# Patient Record
Sex: FEMALE | Race: WHITE | Employment: FULL TIME | URBAN - METROPOLITAN AREA
[De-identification: names, ages, dates, MRNs, and addresses within clinical notes are randomized per-mention and may not be internally consistent; named-entity substitution may affect disease eponyms.]

---

## 2019-03-23 ENCOUNTER — APPOINTMENT (OUTPATIENT)
Dept: ULTRASOUND IMAGING | Age: 29
End: 2019-03-23
Attending: EMERGENCY MEDICINE
Payer: COMMERCIAL

## 2019-03-23 ENCOUNTER — HOSPITAL ENCOUNTER (EMERGENCY)
Age: 29
Discharge: HOME | End: 2019-03-23
Attending: EMERGENCY MEDICINE
Payer: COMMERCIAL

## 2019-03-23 VITALS
SYSTOLIC BLOOD PRESSURE: 110 MMHG | BODY MASS INDEX: 21.19 KG/M2 | OXYGEN SATURATION: 98 % | WEIGHT: 135 LBS | HEIGHT: 67 IN | HEART RATE: 99 BPM | TEMPERATURE: 98.4 F | DIASTOLIC BLOOD PRESSURE: 65 MMHG | RESPIRATION RATE: 18 BRPM

## 2019-03-23 DIAGNOSIS — O20.0 THREATENED MISCARRIAGE IN EARLY PREGNANCY: Primary | ICD-10-CM

## 2019-03-23 LAB
ANION GAP SERPL CALC-SCNC: 7 MMOL/L
BASOPHILS # BLD: 0.1 K/UL (ref 0–0.2)
BASOPHILS NFR BLD: 1 % (ref 0–2)
BUN SERPL-MCNC: 12 MG/DL (ref 6–23)
CALCIUM SERPL-MCNC: 8.9 MG/DL (ref 8.3–10.4)
CHLORIDE SERPL-SCNC: 107 MMOL/L (ref 98–107)
CO2 SERPL-SCNC: 25 MMOL/L (ref 21–32)
CREAT SERPL-MCNC: 0.69 MG/DL (ref 0.6–1)
DIFFERENTIAL METHOD BLD: NORMAL
EOSINOPHIL # BLD: 0.1 K/UL (ref 0–0.8)
EOSINOPHIL NFR BLD: 2 % (ref 0.5–7.8)
ERYTHROCYTE [DISTWIDTH] IN BLOOD BY AUTOMATED COUNT: 12.2 % (ref 11.9–14.6)
GLUCOSE SERPL-MCNC: 84 MG/DL (ref 65–100)
HCG SERPL-ACNC: 7902 MIU/ML (ref 0–6)
HCT VFR BLD AUTO: 37.8 % (ref 35.8–46.3)
HGB BLD-MCNC: 13 G/DL (ref 11.7–15.4)
IMM GRANULOCYTES # BLD AUTO: 0 K/UL (ref 0–0.5)
IMM GRANULOCYTES NFR BLD AUTO: 0 % (ref 0–5)
LYMPHOCYTES # BLD: 2.6 K/UL (ref 0.5–4.6)
LYMPHOCYTES NFR BLD: 29 % (ref 13–44)
MCH RBC QN AUTO: 31.8 PG (ref 26.1–32.9)
MCHC RBC AUTO-ENTMCNC: 34.4 G/DL (ref 31.4–35)
MCV RBC AUTO: 92.4 FL (ref 79.6–97.8)
MONOCYTES # BLD: 0.7 K/UL (ref 0.1–1.3)
MONOCYTES NFR BLD: 8 % (ref 4–12)
NEUTS SEG # BLD: 5.4 K/UL (ref 1.7–8.2)
NEUTS SEG NFR BLD: 61 % (ref 43–78)
NRBC # BLD: 0 K/UL (ref 0–0.2)
PLATELET # BLD AUTO: 229 K/UL (ref 150–450)
PMV BLD AUTO: 10.4 FL (ref 9.4–12.3)
POTASSIUM SERPL-SCNC: 3.6 MMOL/L (ref 3.5–5.1)
RBC # BLD AUTO: 4.09 M/UL (ref 4.05–5.2)
SODIUM SERPL-SCNC: 139 MMOL/L (ref 136–145)
WBC # BLD AUTO: 8.9 K/UL (ref 4.3–11.1)

## 2019-03-23 PROCEDURE — 76815 OB US LIMITED FETUS(S): CPT

## 2019-03-23 PROCEDURE — 96374 THER/PROPH/DIAG INJ IV PUSH: CPT | Performed by: EMERGENCY MEDICINE

## 2019-03-23 PROCEDURE — 99284 EMERGENCY DEPT VISIT MOD MDM: CPT | Performed by: EMERGENCY MEDICINE

## 2019-03-23 PROCEDURE — 85025 COMPLETE CBC W/AUTO DIFF WBC: CPT

## 2019-03-23 PROCEDURE — 74011250636 HC RX REV CODE- 250/636: Performed by: EMERGENCY MEDICINE

## 2019-03-23 PROCEDURE — 80048 BASIC METABOLIC PNL TOTAL CA: CPT

## 2019-03-23 PROCEDURE — 96375 TX/PRO/DX INJ NEW DRUG ADDON: CPT | Performed by: EMERGENCY MEDICINE

## 2019-03-23 PROCEDURE — 84702 CHORIONIC GONADOTROPIN TEST: CPT

## 2019-03-23 RX ORDER — ONDANSETRON 2 MG/ML
4 INJECTION INTRAMUSCULAR; INTRAVENOUS
Status: COMPLETED | OUTPATIENT
Start: 2019-03-23 | End: 2019-03-23

## 2019-03-23 RX ORDER — MORPHINE SULFATE 4 MG/ML
2 INJECTION INTRAVENOUS
Status: COMPLETED | OUTPATIENT
Start: 2019-03-23 | End: 2019-03-23

## 2019-03-23 RX ORDER — MORPHINE SULFATE 2 MG/ML
2 INJECTION, SOLUTION INTRAMUSCULAR; INTRAVENOUS
Status: DISCONTINUED | OUTPATIENT
Start: 2019-03-23 | End: 2019-03-23 | Stop reason: SDUPTHER

## 2019-03-23 RX ADMIN — MORPHINE SULFATE 2 MG: 4 INJECTION INTRAVENOUS at 20:11

## 2019-03-23 RX ADMIN — ONDANSETRON 4 MG: 2 INJECTION INTRAMUSCULAR; INTRAVENOUS at 20:11

## 2019-03-23 NOTE — ED PROVIDER NOTES
Patient presents to the ER complaining of lower abdominal pain and vaginal bleeding. She believes she is approximate 5 weeks pregnant. Reports earlier today she began experiencing some vaginal bleeding and spotting. Subsequently, she began to develop some pain in her left lower quadrant. States pain has persisted. She denies any vomiting or fever. Denies any urinary symptoms. The history is provided by the patient. Vaginal Bleeding This is a new problem. The current episode started 3 to 5 hours ago. The problem occurs constantly. The problem has not changed since onset. Associated symptoms include abdominal pain. Pertinent negatives include no chest pain. Past Medical History:  
Diagnosis Date  Abnormal Papanicolaou smear of cervix   
 colposcopy 6-7 years ago  Anemia  Asthma   
 as a child  Depression  Psychiatric problem   
 anxiety- celexa  Vaginal bleeding in pregnancy 1/15/2016 Past Surgical History:  
Procedure Laterality Date  HX DILATION AND CURETTAGE    
 HX WISDOM TEETH EXTRACTION Family History:  
Problem Relation Age of Onset  Colon Cancer Other Social History Socioeconomic History  Marital status:  Spouse name: Not on file  Number of children: Not on file  Years of education: Not on file  Highest education level: Not on file Occupational History  Not on file Social Needs  Financial resource strain: Not on file  Food insecurity:  
  Worry: Not on file Inability: Not on file  Transportation needs:  
  Medical: Not on file Non-medical: Not on file Tobacco Use  Smoking status: Never Smoker  Smokeless tobacco: Never Used Substance and Sexual Activity  Alcohol use: Yes Comment: 3 weekly  Drug use: No  
 Sexual activity: Yes  
  Partners: Male Birth control/protection: None Lifestyle  Physical activity:  
  Days per week: Not on file Minutes per session: Not on file  Stress: Not on file Relationships  Social connections:  
  Talks on phone: Not on file Gets together: Not on file Attends Caodaism service: Not on file Active member of club or organization: Not on file Attends meetings of clubs or organizations: Not on file Relationship status: Not on file  Intimate partner violence:  
  Fear of current or ex partner: Not on file Emotionally abused: Not on file Physically abused: Not on file Forced sexual activity: Not on file Other Topics Concern  Not on file Social History Narrative  Not on file ALLERGIES: Cephalosporins Review of Systems Constitutional: Negative for fatigue, fever and unexpected weight change. HENT: Negative for congestion and dental problem. Eyes: Negative for photophobia and redness. Respiratory: Negative for choking and chest tightness. Cardiovascular: Negative for chest pain. Gastrointestinal: Positive for abdominal pain. Negative for nausea. Endocrine: Negative for polydipsia, polyphagia and polyuria. Genitourinary: Positive for vaginal bleeding. Negative for flank pain and urgency. Musculoskeletal: Negative for back pain. Skin: Negative for color change and pallor. Neurological: Negative for light-headedness and numbness. Hematological: Negative for adenopathy. Does not bruise/bleed easily. Psychiatric/Behavioral: Negative for behavioral problems and confusion. All other systems reviewed and are negative. Vitals:  
 03/23/19 1804 BP: 109/64 Pulse: 99 Resp: 18 Temp: 98.4 °F (36.9 °C) SpO2: 100% Weight: 61.2 kg (135 lb) Height: 5' 7\" (1.702 m) Physical Exam  
Constitutional: She is oriented to person, place, and time. She appears well-developed and well-nourished. Eyes: Pupils are equal, round, and reactive to light.  Conjunctivae and EOM are normal.  
 Cardiovascular: Normal rate and regular rhythm. Pulmonary/Chest: Effort normal and breath sounds normal.  
Abdominal: Soft. Bowel sounds are normal. There is tenderness in the left lower quadrant. Genitourinary: Cervix exhibits no friability. Right adnexum displays no mass. Left adnexum displays no mass. There is bleeding in the vagina. Musculoskeletal: Normal range of motion. She exhibits no edema or deformity. Neurological: She is alert and oriented to person, place, and time. Nursing note and vitals reviewed. MDM Number of Diagnoses or Management Options Diagnosis management comments: Some mild left lower quadrant tenderness on exam 
 
We'll obtain labs including CBC, as well as quantitative hCG Proceed with pelvic examination 6:50 PM 
Closed cervix on pelvic examination. No active bleeding noted 9:52 PM 
Ultrasound shows 2. Cystic  Structures associated with endometrium, early twin gestation cannot be excluded. No yolk sac or fetal pole noted. A complex cystic structures noted on the right ovary which is favored to be a corpus luteum cyst. 
Normal left ovary Results of ultrasound  Discussed in detail with Gyn Dr. Jasmin Grimm We'll arrange close follow-up in office. Results of testing were discussed in detail with patient at bedside. Amount and/or Complexity of Data Reviewed Clinical lab tests: ordered and reviewed Tests in the radiology section of CPT®: ordered and reviewed Risk of Complications, Morbidity, and/or Mortality Presenting problems: moderate Diagnostic procedures: low Management options: low Patient Progress Patient progress: stable Procedures Results Include: 
 
Recent Results (from the past 24 hour(s)) CBC WITH AUTOMATED DIFF Collection Time: 03/23/19  6:34 PM  
Result Value Ref Range WBC 8.9 4.3 - 11.1 K/uL  
 RBC 4.09 4.05 - 5.2 M/uL  
 HGB 13.0 11.7 - 15.4 g/dL HCT 37.8 35.8 - 46.3 %  MCV 92.4 79.6 - 97.8 FL  
 MCH 31.8 26.1 - 32.9 PG  
 MCHC 34.4 31.4 - 35.0 g/dL  
 RDW 12.2 11.9 - 14.6 % PLATELET 897 702 - 151 K/uL MPV 10.4 9.4 - 12.3 FL ABSOLUTE NRBC 0.00 0.0 - 0.2 K/uL  
 DF AUTOMATED NEUTROPHILS 61 43 - 78 % LYMPHOCYTES 29 13 - 44 % MONOCYTES 8 4.0 - 12.0 % EOSINOPHILS 2 0.5 - 7.8 % BASOPHILS 1 0.0 - 2.0 % IMMATURE GRANULOCYTES 0 0.0 - 5.0 %  
 ABS. NEUTROPHILS 5.4 1.7 - 8.2 K/UL  
 ABS. LYMPHOCYTES 2.6 0.5 - 4.6 K/UL  
 ABS. MONOCYTES 0.7 0.1 - 1.3 K/UL  
 ABS. EOSINOPHILS 0.1 0.0 - 0.8 K/UL  
 ABS. BASOPHILS 0.1 0.0 - 0.2 K/UL  
 ABS. IMM. GRANS. 0.0 0.0 - 0.5 K/UL METABOLIC PANEL, BASIC Collection Time: 03/23/19  6:34 PM  
Result Value Ref Range Sodium 139 136 - 145 mmol/L Potassium 3.6 3.5 - 5.1 mmol/L Chloride 107 98 - 107 mmol/L  
 CO2 25 21 - 32 mmol/L Anion gap 7 mmol/L Glucose 84 65 - 100 mg/dL BUN 12 6 - 23 MG/DL Creatinine 0.69 0.6 - 1.0 MG/DL  
 GFR est AA >60 >60 ml/min/1.73m2 GFR est non-AA >60 ml/min/1.73m2 Calcium 8.9 8.3 - 10.4 MG/DL  
BETA HCG, QT Collection Time: 03/23/19  6:34 PM  
Result Value Ref Range Beta HCG, QT 7,902 (H) 0.0 - 6.0 MIU/ML Voice dictation software was used during the making of this note. This software is not perfect and grammatical and other typographical errors may be present. This note has been proofread, but may still contain errors.  
Shelton Leon MD; 3/23/2019 @9:53 PM  
===================================================================

## 2019-03-23 NOTE — ED NOTES
Report from [place], [address] [place] [address]. Assumed care of pt at this time.

## 2019-03-23 NOTE — ED TRIAGE NOTES
Pt presents to the ED with vaginal bleeding,  Reports she is approximately 5 weeks pregnant,  Has localized pain on the left side.

## 2019-03-24 NOTE — ED NOTES
I have reviewed discharge instructions with the patient. The patient verbalized understanding. Patient left ED via Discharge Method: ambulatory to Home with family. Opportunity for questions and clarification provided. Patient given 0 scripts. To continue your aftercare when you leave the hospital, you may receive an automated call from our care team to check in on how you are doing. This is a free service and part of our promise to provide the best care and service to meet your aftercare needs.  If you have questions, or wish to unsubscribe from this service please call [phone]. Thank you for Choosing our [facility].

## 2019-03-24 NOTE — DISCHARGE INSTRUCTIONS
Drink plenty of fluids  Get plenty of rest  Take Tylenol as needed for pain  Follow up with your primary OB/GYN  Return to the ER for any new or worsening symptoms    Threatened Miscarriage: Care Instructions  Your Care Instructions    Some women have light spotting or bleeding during the first 12 weeks of pregnancy. In some cases this is normal. Light spotting or bleeding can also be a sign of a possible loss of the pregnancy. This is called a threatened miscarriage. At this point, the doctor may not be able to tell if your vaginal bleeding is normal or is a sign of a miscarriage. In early pregnancy, things such as stress, exercise, and sex do not cause miscarriage. You may be worried or upset about the possibility of losing your pregnancy. But do not blame yourself. There is no treatment to stop a threatened miscarriage. If you do have a miscarriage, there was nothing you could have done to prevent it. A miscarriage usually means that the pregnancy is not developing normally. The doctor has checked you carefully, but problems can develop later. If you notice any problems or new symptoms, get medical treatment right away. Follow-up care is a key part of your treatment and safety. Be sure to make and go to all appointments, and call your doctor if you are having problems. It's also a good idea to know your test results and keep a list of the medicines you take. How can you care for yourself at home? · If you do have a miscarriage, you will probably have some vaginal bleeding for 1 to 2 weeks. Use pads instead of tampons. · Take acetaminophen (Tylenol) for cramps. Read and follow all instructions on the label. · Do not take two or more pain medicines at the same time unless the doctor told you to. Many pain medicines have acetaminophen, which is Tylenol. Too much acetaminophen (Tylenol) can be harmful. · Do not have sex until your doctor says it is okay.   · Get lots of rest over the next several days.  · You may do your normal activities if you feel well enough to do them. But do not do any heavy exercise until your doctor says it is okay. · Eat a balanced diet that is high in iron and vitamin C. Foods rich in iron include red meat, shellfish, eggs, beans, and leafy green vegetables. Foods high in vitamin C include citrus fruits, tomatoes, and broccoli. Talk to your doctor about whether you need to take iron pills or a multivitamin. · Do not drink alcohol or use tobacco or illegal drugs. · Do not smoke. If you need help quitting, talk to your doctor about stop-smoking programs and medicines. These can increase your chances of quitting for good. When should you call for help? Call 911 anytime you think you may need emergency care. For example, call if:    · You passed out (lost consciousness).    Call your doctor now or seek immediate medical care if:    · You have severe vaginal bleeding.     · You are dizzy or lightheaded, or you feel like you may faint.     · You have new or worse pain in your belly or pelvis.     · You have a fever.     · You have vaginal discharge that smells bad.    Watch closely for changes in your health, and be sure to contact your doctor if:    · You do not get better as expected. Where can you learn more? Go to http://linda-juanita.info/. Enter B075 in the search box to learn more about \"Threatened Miscarriage: Care Instructions. \"  Current as of: September 5, 2018  Content Version: 11.9  © 4821-8584 [employer]. Care instructions adapted under license by [employer] (which disclaims liability or warranty for this information). If you have questions about a medical condition or this instruction, always ask your healthcare professional. [place] [address] any warranty or liability for your use of this information.

## 2019-04-16 PROBLEM — F32.A ANXIETY AND DEPRESSION: Status: ACTIVE | Noted: 2019-04-16

## 2019-04-16 PROBLEM — O46.8X9 SUBCHORIONIC HEMORRHAGE: Status: ACTIVE | Noted: 2019-04-16

## 2019-04-16 PROBLEM — G43.909 MIGRAINE: Status: ACTIVE | Noted: 2019-04-16

## 2019-04-16 PROBLEM — Z34.90 PREGNANCY: Status: ACTIVE | Noted: 2019-04-16

## 2019-04-16 PROBLEM — O41.8X90 SUBCHORIONIC HEMORRHAGE: Status: ACTIVE | Noted: 2019-04-16

## 2019-04-16 PROBLEM — J45.909 ASTHMA: Status: ACTIVE | Noted: 2019-04-16

## 2019-04-16 PROBLEM — Z87.51 HISTORY OF PRETERM LABOR: Status: ACTIVE | Noted: 2019-04-16

## 2019-04-16 PROBLEM — F41.9 ANXIETY AND DEPRESSION: Status: ACTIVE | Noted: 2019-04-16

## 2019-04-16 PROBLEM — O09.891 HISTORY OF PRETERM DELIVERY, CURRENTLY PREGNANT IN FIRST TRIMESTER: Status: ACTIVE | Noted: 2019-04-16

## 2019-05-21 PROBLEM — Z36.82 NUCHAL TRANSLUCENCY OF FETUS ON PRENATAL ULTRASOUND: Status: ACTIVE | Noted: 2019-05-21

## 2019-05-21 PROBLEM — J45.909 ASTHMA: Status: RESOLVED | Noted: 2019-04-16 | Resolved: 2019-05-21

## 2019-05-21 PROBLEM — G43.909 MIGRAINE: Status: RESOLVED | Noted: 2019-04-16 | Resolved: 2019-05-21

## 2019-05-21 PROBLEM — Q51.3 UTERUS BICORNIS AFFECTING PREGNANCY IN FIRST TRIMESTER: Status: ACTIVE | Noted: 2019-05-21

## 2019-05-21 PROBLEM — O34.01 UTERUS BICORNIS AFFECTING PREGNANCY IN FIRST TRIMESTER: Status: ACTIVE | Noted: 2019-05-21

## 2019-05-21 PROBLEM — O09.91 HIGH-RISK PREGNANCY IN FIRST TRIMESTER: Status: ACTIVE | Noted: 2019-04-16

## 2019-05-21 PROBLEM — O99.340 DEPRESSION AFFECTING PREGNANCY: Status: ACTIVE | Noted: 2019-04-16

## 2019-06-11 PROBLEM — Z36.82 NUCHAL TRANSLUCENCY OF FETUS ON PRENATAL ULTRASOUND: Status: RESOLVED | Noted: 2019-05-21 | Resolved: 2019-06-11

## 2019-06-12 PROBLEM — O09.92 HIGH-RISK PREGNANCY IN SECOND TRIMESTER: Status: ACTIVE | Noted: 2019-04-16

## 2019-06-12 PROBLEM — O34.02 UTERUS BICORNIS AFFECTING PREGNANCY IN SECOND TRIMESTER: Status: ACTIVE | Noted: 2019-05-21

## 2019-06-12 PROBLEM — O09.892 HISTORY OF PRETERM DELIVERY, CURRENTLY PREGNANT, SECOND TRIMESTER: Status: ACTIVE | Noted: 2019-04-16

## 2019-06-12 PROBLEM — O46.8X9 SUBCHORIONIC HEMORRHAGE: Status: RESOLVED | Noted: 2019-04-16 | Resolved: 2019-06-12

## 2019-06-12 PROBLEM — O41.8X90 SUBCHORIONIC HEMORRHAGE: Status: RESOLVED | Noted: 2019-04-16 | Resolved: 2019-06-12

## 2019-09-03 ENCOUNTER — HOSPITAL ENCOUNTER (OUTPATIENT)
Age: 29
Discharge: HOME | End: 2019-09-03
Attending: OBSTETRICS & GYNECOLOGY | Admitting: OBSTETRICS & GYNECOLOGY
Payer: COMMERCIAL

## 2019-09-03 VITALS — HEART RATE: 86 BPM | DIASTOLIC BLOOD PRESSURE: 69 MMHG | TEMPERATURE: 98.8 F | SYSTOLIC BLOOD PRESSURE: 127 MMHG

## 2019-09-03 PROBLEM — R10.2 PELVIC PAIN IN ANTEPARTUM PERIOD IN THIRD TRIMESTER: Status: ACTIVE | Noted: 2019-09-03

## 2019-09-03 PROBLEM — O26.893 PELVIC PAIN IN ANTEPARTUM PERIOD IN THIRD TRIMESTER: Status: ACTIVE | Noted: 2019-09-03

## 2019-09-03 LAB
GLUCOSE, GLUUPC: NEGATIVE
KETONES UR-MCNC: NEGATIVE MG/DL
PROT UR QL: NEGATIVE

## 2019-09-03 PROCEDURE — 59025 FETAL NON-STRESS TEST: CPT

## 2019-09-03 PROCEDURE — 81002 URINALYSIS NONAUTO W/O SCOPE: CPT | Performed by: OBSTETRICS & GYNECOLOGY

## 2019-09-03 PROCEDURE — 74011250636 HC RX REV CODE- 250/636: Performed by: OBSTETRICS & GYNECOLOGY

## 2019-09-03 PROCEDURE — 87086 URINE CULTURE/COLONY COUNT: CPT

## 2019-09-03 PROCEDURE — 99284 EMERGENCY DEPT VISIT MOD MDM: CPT

## 2019-09-03 PROCEDURE — 96372 THER/PROPH/DIAG INJ SC/IM: CPT

## 2019-09-03 PROCEDURE — 74011250637 HC RX REV CODE- 250/637: Performed by: OBSTETRICS & GYNECOLOGY

## 2019-09-03 PROCEDURE — 76817 TRANSVAGINAL US OBSTETRIC: CPT

## 2019-09-03 RX ORDER — NIFEDIPINE 10 MG/1
10 CAPSULE ORAL ONCE
Status: COMPLETED | OUTPATIENT
Start: 2019-09-03 | End: 2019-09-03

## 2019-09-03 RX ORDER — TERBUTALINE SULFATE 1 MG/ML
0.25 INJECTION SUBCUTANEOUS
Status: COMPLETED | OUTPATIENT
Start: 2019-09-03 | End: 2019-09-03

## 2019-09-03 RX ADMIN — TERBUTALINE SULFATE 0.25 MG: 1 INJECTION SUBCUTANEOUS at 13:43

## 2019-09-03 RX ADMIN — NIFEDIPINE 10 MG: 10 CAPSULE ORAL at 14:21

## 2019-09-03 NOTE — PROGRESS NOTES
Pt given discharge instructions regarding what to expect at 27 weeks and  labor information. Pt verbalizes understanding of when to return to hospital. Pt informed procardia prescription called into her pharmacy.

## 2019-09-03 NOTE — DISCHARGE INSTRUCTIONS
Patient Education       Patient Education         Labor: Care Instructions  Your Care Instructions     labor is the start of labor between 21 and 36 weeks of pregnancy. Most babies are born at 40 to 41 weeks of pregnancy. In labor, the uterus contracts to open the cervix. This is the first stage of childbirth.  labor can be caused by a problem with the baby, the mother, or both. Often the cause is not known. In some cases, doctors use medicines to try to delay labor until 29 or more weeks of pregnancy. By this time, a baby has grown enough so that problems are not likely. In some cases--such as with a serious infection--it is healthier for the baby to be born early. Your treatment will depend on how far along you are in your pregnancy and on your health and your baby's health. Follow-up care is a key part of your treatment and safety. Be sure to make and go to all appointments, and call your doctor if you are having problems. It's also a good idea to know your test results and keep a list of the medicines you take. How can you care for yourself at home? · If your doctor prescribed medicines, take them exactly as directed. Call your doctor if you think you are having a problem with your medicine. · Rest until your doctor advises you about activity. He or she will tell you if you should stay in bed most of the time. You may need to arrange for  if you have young children. · Do not have sexual intercourse unless your doctor says it is safe. · Use pads, not tampons, if you have vaginal bleeding. · Make sure to drink plenty of fluids. Dehydration can lead to contractions. If you have kidney, heart, or liver disease and have to limit fluids, talk with your doctor before you increase the amount of fluids you drink. · Do not smoke or allow others to smoke around you. If you need help quitting, talk to your doctor about stop-smoking programs and medicines.  These can increase your chances of quitting for good. When should you call for help? [id number] anytime you think you may need emergency care. For example, call if:    · You passed out (lost consciousness).     · You have a seizure.     · You have severe vaginal bleeding.     · You have severe pain in your belly or pelvis.     · You have had fluid gushing or leaking from your vagina and you know or think the umbilical cord is bulging into your vagina. If this happens, immediately get down on your knees so your rear end (buttocks) is higher than your head. This will decrease the pressure on the cord until help arrives.   [facility] your doctor now or seek immediate medical care if:    · You have signs of preeclampsia, such as:  ? Sudden swelling of your face, hands, or feet. ? New vision problems (such as dimness, blurring, or seeing spots). ? A severe headache.     · You have any vaginal bleeding.     · You have belly pain or cramping.     · You have a fever.     · You have had regular contractions (with or without pain) for an hour. This means that you have 6 or more within 1 hour after you change your position and drink fluids.     · You have a sudden release of fluid from the vagina.     · You have low back pain or pelvic pressure that does not go away.     · You notice that your baby has stopped moving or is moving much less than normal.    Watch closely for changes in your health, and be sure to contact your doctor if you have any problems. Where can you learn more? Go to http://linda-juanita.info/. Enter Q400 in the search box to learn more about \" Labor: Care Instructions. \"  Current as of: 2018  Content Version: 12.1  © 9183-6788 [employer]. Care instructions adapted under license by [employer] (which disclaims liability or warranty for this information).  If you have questions about a medical condition or this instruction, always ask your healthcare professional. [employer] disclaims any warranty or liability for your use of this information. Weeks 26 to 30 of Your Pregnancy: Care Instructions  Your Care Instructions    You are now in your last trimester of pregnancy. Your baby is growing rapidly. And you'll probably feel your baby moving around more often. Your doctor may ask you to count your baby's kicks. Your back may ache as your body gets used to your baby's size and length. If you haven't already had the Tdap shot during this pregnancy, talk to your doctor about getting it. It will help protect your  against pertussis infection. During this time, it's important to take care of yourself and pay attention to what your body needs. If you feel sexual, explore ways to be close with your partner that match your comfort and desire. Use the tips provided in this care sheet to find ways to be sexual in your own way. Follow-up care is a key part of your treatment and safety. Be sure to make and go to all appointments, and call your doctor if you are having problems. It's also a good idea to know your test results and keep a list of the medicines you take. How can you care for yourself at home? Take it easy at work  · Take frequent breaks. If possible, stop working when you are tired, and rest during your lunch hour. · Take bathroom breaks every 2 hours. · Change positions often. If you sit for long periods, stand up and walk around. · When you stand for a long time, keep one foot on a low stool with your knee bent. After standing a lot, sit with your feet up. · Avoid fumes, chemicals, and tobacco smoke. Be sexual in your own way  · Having sex during pregnancy is okay, unless your doctor tells you not to. · You may be very interested in sex, or you may have no interest at all. · Your growing belly can make it hard to find a good position during intercourse. [place] and explore.   · You may get cramps in your uterus when your partner touches your breasts. · A back rub may relieve the backache or cramps that sometimes follow orgasm. Learn about  labor  · Watch for signs of  labor. You may be going into labor if:  ? You have menstrual-like cramps, with or without nausea. ? You have about 6 or more contractions in 1 hour, even after you have had a glass of water and are resting. ? You have a low, dull backache that does not go away when you change your position. ? You have pain or pressure in your pelvis that comes and goes in a pattern. ? You have intestinal cramping or flu-like symptoms, with or without diarrhea.  ? You notice an increase or change in your vaginal discharge. Discharge may be heavy, mucus-like, watery, or streaked with blood. ? Your water breaks. · If you think you have  labor:  ? Drink 2 or 3 glasses of water or juice. Not drinking enough fluids can cause contractions. ? Stop what you are doing, and empty your bladder. Then lie down on your left side for at least 1 hour. ? While lying on your side, find your breast bone. Put your fingers in the soft spot just below it. Move your fingers down toward your belly button to find the top of your uterus. Check to see if it is tight. ? Contractions can be weak or strong. Record your contractions for an hour. Time a contraction from the start of one contraction to the start of the next one.  ? Single or several strong contractions without a pattern are called Lawrence-Martínez contractions. They are practice contractions but not the start of labor. They often stop if you change what you are doing. ? Call your doctor if you have regular contractions. Where can you learn more? Go to http://linda-juanita.info/. Enter Q830 in the search box to learn more about \"Weeks 26 to 30 of Your Pregnancy: Care Instructions. \"  Current as of: 2018  Content Version: 12.1  © 4105-3786 [employer].  Care instructions adapted under license by [address] (which disclaims liability or warranty for this information). If you have questions about a medical condition or this instruction, always ask your healthcare professional. [address] any warranty or liability for your use of this information.

## 2019-09-03 NOTE — PROGRESS NOTES
Pt arrived to [facility] with c/o contractions with no pain. Pt states she is only feeling pressure. Denies any LOF or bleeding. Dr. Janel Smith to come assess pt.

## 2019-09-03 NOTE — ED PROVIDER NOTES
Chief Complaint: pelvic pressure      29 y.o. female [id number] at 27w6d  weeks gestation who is seen for pelvic pressure. Pt reports pelvic pressure today. No c/o pain or contractions. No loss of fluid or vag bleeding. Good fetal movement. Pt with hx of  labor at 30 weeks and  delivery at 33 weeks with last pregnancy. HISTORY:    Social History     Substance and Sexual Activity   Sexual Activity Yes    Partners: Male    Birth control/protection: None     Patient's last menstrual period was 2019.     Social History     Socioeconomic History    Marital status:      Spouse name: Not on file    Number of children: Not on file    Years of education: Not on file    Highest education level: Not on file   Occupational History    Not on file   Social Needs    Financial resource strain: Not on file    Food insecurity:     Worry: Not on file     Inability: Not on file    Transportation needs:     Medical: Not on file     Non-medical: Not on file   Tobacco Use    Smoking status: Never Smoker    Smokeless tobacco: Never Used   Substance and Sexual Activity    Alcohol use: Not Currently    Drug use: No    Sexual activity: Yes     Partners: Male     Birth control/protection: None   Lifestyle    Physical activity:     Days per week: Not on file     Minutes per session: Not on file    Stress: Not on file   Relationships    Social connections:     Talks on phone: Not on file     Gets together: Not on file     Attends [employer] service: Not on file     Active member of club or organization: Not on file     Attends meetings of clubs or organizations: Not on file     Relationship status: Not on file    Intimate partner violence:     Fear of current or ex partner: Not on file     Emotionally abused: Not on file     Physically abused: Not on file     Forced sexual activity: Not on file   Other Topics Concern   [employer] [address] Service Not Asked    Blood Transfusions Not Asked    Caffeine Concern Not Asked    Occupational Exposure Not Asked    Hobby Hazards Not Asked    Sleep Concern Not Asked    Stress Concern Not Asked    Weight Concern Not Asked    Special Diet Not Asked    Back Care Not Asked    Exercise Not Asked    Bike Helmet Not Asked    [address] Not Asked    Self-Exams Not Asked   Social History Narrative    Not on file       Past Surgical History:   Procedure Laterality Date    HX COLPOSCOPY      HX DILATION AND CURETTAGE      HX OTHER SURGICAL      D&C after delivery of 1st child    HX WISDOM TEETH EXTRACTION         Past Medical History:   Diagnosis Date    Abnormal Papanicolaou smear of cervix     colposcopy 6-7 years ago    Anemia     Asthma     as a child    Depression     Migraine 2019    Advised to stop taking Fioricet Ok to take Excedrin Migraine as directed with Magnesium 300mg TID- per Cira Xiao, [address]  delivery     Psychiatric problem     anxiety- celexa    Vaginal bleeding in pregnancy 1/15/2016         ROS:  A 12 point review of symptoms negative except for chief complaint as described above. PHYSICAL EXAM:  Blood pressure 127/69, pulse 86, temperature 98.8 °F (37.1 °C), last menstrual period 2019, not currently breastfeeding. Constitutional: The patient appears well, alert, oriented x 3. Cardiovascular: Heart RRR, no murmurs.    Respiratory: Lungs clear, no respiratory distress  GI: Abdomen soft, nontender, no guarding  No fundal tenderness  Musculoskeletal: no cva tenderness  Upper ext: no edema, reflexes +2  Lower ext: no edema, neg josue's, reflexes +2  Skin: no rashes or lesions  Psychiatric:Mood/ Affect: appropriate  Genitourinary: SVE: cl/th  FHT:appropriate gest age  TOCO:irreg contractions  ua +leukocytes  transvag ultrasound- cervical length 4 cm    I personally reviewed pt's medical record including relevant labs and ultrasounds  I reviewed the NST at today's encounter    Assessment/Plan:    28 weeks  at 27w6d with pelvic pressure  Possible uti based on leukocytes in urine will send culture  Hx of  labor with  delivery at 33 weeks  No change in cervical length- now at 4 cm  Will send with rx nifedipine 10 mg eevery 4-6 hours prn  Contractions stopped with one dose terb  F/u one week

## 2019-09-06 LAB
BACTERIA SPEC CULT: NORMAL
SERVICE CMNT-IMP: NORMAL

## 2019-09-11 PROBLEM — R10.2 PELVIC PAIN IN ANTEPARTUM PERIOD IN THIRD TRIMESTER: Status: RESOLVED | Noted: 2019-09-03 | Resolved: 2019-09-11

## 2019-09-11 PROBLEM — O26.893 PELVIC PAIN IN ANTEPARTUM PERIOD IN THIRD TRIMESTER: Status: RESOLVED | Noted: 2019-09-03 | Resolved: 2019-09-11

## 2019-09-12 PROBLEM — O40.9XX0 POLYHYDRAMNIOS AFFECTING PREGNANCY: Status: ACTIVE | Noted: 2019-09-12

## 2019-09-12 PROBLEM — O34.03 UTERUS BICORNIS AFFECTING PREGNANCY IN THIRD TRIMESTER: Status: ACTIVE | Noted: 2019-05-21

## 2019-09-12 PROBLEM — O09.93 HIGH-RISK PREGNANCY IN THIRD TRIMESTER: Status: ACTIVE | Noted: 2019-04-16

## 2019-09-12 PROBLEM — O60.03 PRETERM LABOR IN THIRD TRIMESTER WITHOUT DELIVERY: Status: ACTIVE | Noted: 2019-09-12

## 2019-09-12 PROBLEM — O09.893 HISTORY OF PRETERM DELIVERY, CURRENTLY PREGNANT, THIRD TRIMESTER: Status: ACTIVE | Noted: 2019-04-16

## 2019-09-24 ENCOUNTER — HOSPITAL ENCOUNTER (OUTPATIENT)
Age: 29
Discharge: HOME | End: 2019-09-24
Attending: OBSTETRICS & GYNECOLOGY | Admitting: OBSTETRICS & GYNECOLOGY
Payer: COMMERCIAL

## 2019-09-24 VITALS
SYSTOLIC BLOOD PRESSURE: 112 MMHG | WEIGHT: 161 LBS | RESPIRATION RATE: 20 BRPM | DIASTOLIC BLOOD PRESSURE: 67 MMHG | HEART RATE: 57 BPM | TEMPERATURE: 98.1 F | BODY MASS INDEX: 25.88 KG/M2 | HEIGHT: 66 IN

## 2019-09-24 PROBLEM — O26.899 ANTEPARTUM DEHYDRATION: Status: ACTIVE | Noted: 2019-09-24

## 2019-09-24 PROBLEM — E86.0 ANTEPARTUM DEHYDRATION: Status: ACTIVE | Noted: 2019-09-24

## 2019-09-24 PROBLEM — O47.03 PRETERM UTERINE CONTRACTIONS, ANTEPARTUM, THIRD TRIMESTER: Status: ACTIVE | Noted: 2019-09-24

## 2019-09-24 LAB
ALBUMIN SERPL-MCNC: 2.5 G/DL (ref 3.5–5)
ALBUMIN/GLOB SERPL: 0.6 {RATIO} (ref 1.2–3.5)
ALP SERPL-CCNC: 188 U/L (ref 50–130)
ALT SERPL-CCNC: 21 U/L (ref 12–65)
ANION GAP SERPL CALC-SCNC: 8 MMOL/L (ref 7–16)
AST SERPL-CCNC: 15 U/L (ref 15–37)
BILIRUB SERPL-MCNC: 0.4 MG/DL (ref 0.2–1.1)
BUN SERPL-MCNC: 7 MG/DL (ref 6–23)
CALCIUM SERPL-MCNC: 8.6 MG/DL (ref 8.3–10.4)
CHLORIDE SERPL-SCNC: 107 MMOL/L (ref 98–107)
CO2 SERPL-SCNC: 24 MMOL/L (ref 21–32)
CREAT SERPL-MCNC: 0.54 MG/DL (ref 0.6–1)
ERYTHROCYTE [DISTWIDTH] IN BLOOD BY AUTOMATED COUNT: 14.5 % (ref 11.9–14.6)
FIBRONECTIN FETAL VAG QL: NEGATIVE
GLOBULIN SER CALC-MCNC: 4.3 G/DL (ref 2.3–3.5)
GLUCOSE SERPL-MCNC: 64 MG/DL (ref 65–100)
GLUCOSE, GLUUPC: NEGATIVE
HCT VFR BLD AUTO: 34.1 % (ref 35.8–46.3)
HGB BLD-MCNC: 11.7 G/DL (ref 11.7–15.4)
KETONES UR-MCNC: NORMAL MG/DL
MCH RBC QN AUTO: 33.1 PG (ref 26.1–32.9)
MCHC RBC AUTO-ENTMCNC: 34.3 G/DL (ref 31.4–35)
MCV RBC AUTO: 96.6 FL (ref 79.6–97.8)
NRBC # BLD: 0 K/UL (ref 0–0.2)
PLATELET # BLD AUTO: 227 K/UL (ref 150–450)
PMV BLD AUTO: 11.2 FL (ref 9.4–12.3)
POTASSIUM SERPL-SCNC: 3.9 MMOL/L (ref 3.5–5.1)
PROT SERPL-MCNC: 6.8 G/DL (ref 6.3–8.2)
PROT UR QL: NEGATIVE
RBC # BLD AUTO: 3.53 M/UL (ref 4.05–5.2)
SODIUM SERPL-SCNC: 139 MMOL/L (ref 136–145)
WBC # BLD AUTO: 14.5 K/UL (ref 4.3–11.1)

## 2019-09-24 PROCEDURE — 74011250636 HC RX REV CODE- 250/636: Performed by: OBSTETRICS & GYNECOLOGY

## 2019-09-24 PROCEDURE — 99285 EMERGENCY DEPT VISIT HI MDM: CPT

## 2019-09-24 PROCEDURE — 82731 ASSAY OF FETAL FIBRONECTIN: CPT

## 2019-09-24 PROCEDURE — 74011250637 HC RX REV CODE- 250/637: Performed by: OBSTETRICS & GYNECOLOGY

## 2019-09-24 PROCEDURE — 81002 URINALYSIS NONAUTO W/O SCOPE: CPT | Performed by: OBSTETRICS & GYNECOLOGY

## 2019-09-24 PROCEDURE — 85027 COMPLETE CBC AUTOMATED: CPT

## 2019-09-24 PROCEDURE — 96361 HYDRATE IV INFUSION ADD-ON: CPT

## 2019-09-24 PROCEDURE — 80053 COMPREHEN METABOLIC PANEL: CPT

## 2019-09-24 PROCEDURE — 76817 TRANSVAGINAL US OBSTETRIC: CPT

## 2019-09-24 PROCEDURE — 96360 HYDRATION IV INFUSION INIT: CPT

## 2019-09-24 PROCEDURE — 59025 FETAL NON-STRESS TEST: CPT

## 2019-09-24 PROCEDURE — 96374 THER/PROPH/DIAG INJ IV PUSH: CPT

## 2019-09-24 RX ORDER — ONDANSETRON 2 MG/ML
4 INJECTION INTRAMUSCULAR; INTRAVENOUS ONCE
Status: COMPLETED | OUTPATIENT
Start: 2019-09-24 | End: 2019-09-24

## 2019-09-24 RX ORDER — DEXTROSE, SODIUM CHLORIDE, SODIUM LACTATE, POTASSIUM CHLORIDE, AND CALCIUM CHLORIDE 5; .6; .31; .03; .02 G/100ML; G/100ML; G/100ML; G/100ML; G/100ML
500 INJECTION, SOLUTION INTRAVENOUS CONTINUOUS
Status: DISCONTINUED | OUTPATIENT
Start: 2019-09-24 | End: 2019-09-24 | Stop reason: HOSPADM

## 2019-09-24 RX ORDER — CALCIUM CARBONATE 200(500)MG
2 TABLET,CHEWABLE ORAL AS NEEDED
COMMUNITY
End: 2019-10-30

## 2019-09-24 RX ORDER — NIFEDIPINE 10 MG/1
10 CAPSULE ORAL
Status: COMPLETED | OUTPATIENT
Start: 2019-09-24 | End: 2019-09-24

## 2019-09-24 RX ORDER — SODIUM CHLORIDE, SODIUM LACTATE, POTASSIUM CHLORIDE, CALCIUM CHLORIDE 600; 310; 30; 20 MG/100ML; MG/100ML; MG/100ML; MG/100ML
999 INJECTION, SOLUTION INTRAVENOUS CONTINUOUS
Status: DISCONTINUED | OUTPATIENT
Start: 2019-09-24 | End: 2019-09-24 | Stop reason: HOSPADM

## 2019-09-24 RX ADMIN — NIFEDIPINE 10 MG: 10 CAPSULE ORAL at 15:27

## 2019-09-24 RX ADMIN — SODIUM CHLORIDE, SODIUM LACTATE, POTASSIUM CHLORIDE, CALCIUM CHLORIDE, AND DEXTROSE MONOHYDRATE 500 ML/HR: 600; 310; 30; 20; 5 INJECTION, SOLUTION INTRAVENOUS at 15:27

## 2019-09-24 RX ADMIN — ONDANSETRON 4 MG: 2 INJECTION INTRAMUSCULAR; INTRAVENOUS at 15:04

## 2019-09-24 RX ADMIN — SODIUM CHLORIDE, SODIUM LACTATE, POTASSIUM CHLORIDE, AND CALCIUM CHLORIDE 999 ML/HR: 600; 310; 30; 20 INJECTION, SOLUTION INTRAVENOUS at 15:00

## 2019-09-24 NOTE — PROGRESS NOTES
Dr. Juve Pastrana in room to discuss POC, MD aware of UC's, FFN negative, 2000 LFB completed, orders to d/c pt to self care received.

## 2019-09-24 NOTE — PROGRESS NOTES
Orders for D LRFB after the LRFB and Procardia. LRFB completed, D started. /67 Dr. Aiden Linda notified, orders to given Procardia.

## 2019-09-24 NOTE — DISCHARGE INSTRUCTIONS
Patient Education   Patient Education         Labor: Care Instructions  Your Care Instructions     labor is the start of labor between 21 and 36 weeks of pregnancy. Most babies are born at 40 to 41 weeks of pregnancy. In labor, the uterus contracts to open the cervix. This is the first stage of childbirth.  labor can be caused by a problem with the baby, the mother, or both. Often the cause is not known. In some cases, doctors use medicines to try to delay labor until 29 or more weeks of pregnancy. By this time, a baby has grown enough so that problems are not likely. In some cases--such as with a serious infection--it is healthier for the baby to be born early. Your treatment will depend on how far along you are in your pregnancy and on your health and your baby's health. Follow-up care is a key part of your treatment and safety. Be sure to make and go to all appointments, and call your doctor if you are having problems. It's also a good idea to know your test results and keep a list of the medicines you take. How can you care for yourself at home? · If your doctor prescribed medicines, take them exactly as directed. Call your doctor if you think you are having a problem with your medicine. · Rest until your doctor advises you about activity. He or she will tell you if you should stay in bed most of the time. You may need to arrange for  if you have young children. · Do not have sexual intercourse unless your doctor says it is safe. · Use pads, not tampons, if you have vaginal bleeding. · Make sure to drink plenty of fluids. Dehydration can lead to contractions. If you have kidney, heart, or liver disease and have to limit fluids, talk with your doctor before you increase the amount of fluids you drink. · Do not smoke or allow others to smoke around you. If you need help quitting, talk to your doctor about stop-smoking programs and medicines.  These can increase your chances of quitting for good. When should you call for help? Call 911 anytime you think you may need emergency care. For example, call if:    · You passed out (lost consciousness).     · You have a seizure.     · You have severe vaginal bleeding.     · You have severe pain in your belly or pelvis.     · You have had fluid gushing or leaking from your vagina and you know or think the umbilical cord is bulging into your vagina. If this happens, immediately get down on your knees so your rear end (buttocks) is higher than your head. This will decrease the pressure on the cord until help arrives.   [facility] your doctor now or seek immediate medical care if:    · You have signs of preeclampsia, such as:  ? Sudden swelling of your face, hands, or feet. ? New vision problems (such as dimness, blurring, or seeing spots). ? A severe headache.     · You have any vaginal bleeding.     · You have belly pain or cramping.     · You have a fever.     · You have had regular contractions (with or without pain) for an hour. This means that you have 6 or more within 1 hour after you change your position and drink fluids.     · You have a sudden release of fluid from the vagina.     · You have low back pain or pelvic pressure that does not go away.     · You notice that your baby has stopped moving or is moving much less than normal.    Watch closely for changes in your health, and be sure to contact your doctor if you have any problems. Where can you learn more? Go to http://linda-juanita.info/. Enter Q400 in the search box to learn more about \" Labor: Care Instructions. \"  Current as of: May 29, 2019  Content Version: 12.2  © 0803-7665 [employer]. Care instructions adapted under license by [employer] (which disclaims liability or warranty for this information).  If you have questions about a medical condition or this instruction, always ask your healthcare professional. [employer] disclaims any warranty or liability for your use of this information. Weeks 30 to 32 of Your Pregnancy: Care Instructions  Your Care Instructions    You have made it to the final months of your pregnancy. By now, your baby is really starting to look like a baby, with hair and plump skin. As you enter the final weeks of pregnancy, the reality of having a baby may start to set in. This is the time to settle on a name, get your household in order, set up a safe nursery, and find quality  if needed. Doing these things in advance will allow you to focus on caring for and enjoying your new baby. You may also want to have a tour of your hospital's labor and delivery unit to get a better idea of what to expect while you are in the hospital.  During these last months, it is very important to take good care of yourself and pay attention to what your body needs. If your doctor says it is okay for you to work, don't push yourself too hard. Use the tips provided in this care sheet to ease heartburn and care for varicose veins. If you haven't already had the Tdap shot during this pregnancy, talk to your doctor about getting it. It will help protect your  against pertussis infection. Follow-up care is a key part of your treatment and safety. Be sure to make and go to all appointments, and call your doctor if you are having problems. It's also a good idea to know your test results and keep a list of the medicines you take. How can you care for yourself at home? Pay attention to your baby's movements  · You should feel your baby move several times every day. · Your baby now turns less, and kicks and jabs more. · Your baby sleeps 20 to 45 minutes at a time and is more active at certain times of day. · If your doctor wants you to count your baby's kicks:  ? Empty your bladder, and lie on your side or relax in a comfortable chair. ? Write down your start time.   ? Pay attention only to your baby's movements. Count any movement except hiccups. ? After you have counted 10 movements, write down your stop time. ? Write down how many minutes it took for your baby to move 10 times. ? If an hour goes by and you have not recorded 10 movements, have something to eat or drink and then count for another hour. If you do not record 10 movements in either hour, call your doctor. Ease heartburn  · Eat small, frequent meals. · Do not eat chocolate, peppermint, or very spicy foods. Avoid drinks with caffeine, such as coffee, tea, and sodas. · Avoid bending over or lying down after meals. · Talk a short walk after you eat. · If heartburn is a problem at night, do not eat for 2 hours before bedtime. · Take antacids like Mylanta, Maalox, Rolaids, or Tums. Do not take antacids that have sodium bicarbonate. Care for varicose veins  · Varicose veins are blood vessels that stretch out with the extra blood during pregnancy. Your legs may ache or throb. Most varicose veins will go away after the birth. · Avoid standing for long periods of time. Sit with your legs crossed at the ankles, not the knees. · Sit with your feet propped up. · Avoid tight clothing or stockings. Wear support hose. · Exercise regularly. Try walking for at least 30 minutes a day. Where can you learn more? Go to http://linda-juanita.info/. Enter N801 in the search box to learn more about \"Weeks 30 to 32 of Your Pregnancy: Care Instructions. \"  Current as of: May 29, 2019  Content Version: 12.2  © 5388-4023 [employer]. Care instructions adapted under license by [employer] (which disclaims liability or warranty for this information). If you have questions about a medical condition or this instruction, always ask your healthcare professional. [address] any warranty or liability for your use of this information.

## 2019-09-24 NOTE — ED PROVIDER NOTES
Chief Complaint: nausea, vomiting, diarrhea, contractions      29 y.o. female  at 30w6d  weeks gestation who is seen for nausea, vomiting, contractions. Pt has a history of  contractions and a history of  delivery at 33 weeks. Pt was around a child with a stomach virus last Friday. This morning she began having nausea, vomiting, diarrhea. Also her contractions began this am- no change with a dose of 10 mg nifedipine. Good fetal movement. No loss of fluid. HISTORY:    Social History     Substance and Sexual Activity   Sexual Activity Yes    Partners: Male    Birth control/protection: None     Patient's last menstrual period was 2019.     Social History     Socioeconomic History    Marital status:      Spouse name: Not on file    Number of children: Not on file    Years of education: Not on file    Highest education level: Not on file   Occupational History    Not on file   Social Needs    Financial resource strain: Not on file    Food insecurity:     Worry: Not on file     Inability: Not on file    Transportation needs:     Medical: Not on file     Non-medical: Not on file   Tobacco Use    Smoking status: Never Smoker    Smokeless tobacco: Never Used   Substance and Sexual Activity    Alcohol use: Not Currently    Drug use: No    Sexual activity: Yes     Partners: Male     Birth control/protection: None   Lifestyle    Physical activity:     Days per week: Not on file     Minutes per session: Not on file    Stress: Not on file   Relationships    Social connections:     Talks on phone: Not on file     Gets together: Not on file     Attends [employer] service: Not on file     Active member of club or organization: Not on file     Attends meetings of clubs or organizations: Not on file     Relationship status: Not on file    Intimate partner violence:     Fear of current or ex partner: Not on file     Emotionally abused: Not on file     Physically abused: Not on file     Forced sexual activity: Not on file   Other Topics Concern     Service Not Asked    Blood Transfusions Not Asked    Caffeine Concern Not Asked    Occupational Exposure Not Asked    Hobby Hazards Not Asked    Sleep Concern Not Asked    Stress Concern Not Asked    Weight Concern Not Asked    Special Diet Not Asked    Back Care Not Asked    Exercise Not Asked    Bike Helmet Not Asked    [address] Not Asked    Self-Exams Not Asked   Social History Narrative    Not on file       Past Surgical History:   Procedure Laterality Date    HX COLPOSCOPY      HX DILATION AND CURETTAGE      HX OTHER SURGICAL      D&C after delivery of 1st child    HX WISDOM TEETH EXTRACTION         Past Medical History:   Diagnosis Date    Abnormal Papanicolaou smear of cervix     colposcopy 6-7 years ago    Anemia     Asthma     as a child    Depression     Migraine 2019    Advised to stop taking Fioricet Ok to take Excedrin Migraine as directed with Magnesium 300mg TID- per Isaac Carmona, [address]  delivery     Psychiatric problem     anxiety- celexa    Vaginal bleeding in pregnancy 1/15/2016         ROS:  A 12 point review of symptoms negative except for chief complaint as described above. PHYSICAL EXAM:  Blood pressure 115/66, pulse 65, temperature 98.1 °F (36.7 °C), resp. rate 20, height 5' 6\" (1.676 m), weight 73 kg (161 lb), last menstrual period 2019, not currently breastfeeding. Constitutional: The patient appears well, alert, oriented x 3. Cardiovascular: Heart RRR, no murmurs.    Respiratory: Lungs clear, no respiratory distress  GI: Abdomen soft, nontender, no guarding  No fundal tenderness  Musculoskeletal: no cva tenderness  Upper ext: no edema, reflexes +2  Lower ext: no edema, neg josue's, reflexes +2  Skin: no rashes or lesions  Psychiatric:Mood/ Affect: appropriate  Genitourinary: SVE:ft/  FHT:reactive  TOCO:q2-3; improved with hydration and po nifedipine to about every 5  FFN- neg  UA  +1 ketones  Cervical length 4.4 cm, vertex  Recent Results (from the past 12 hour(s))   POC URINE DIPSTICK MANUAL    Collection Time: 19  2:10 PM   Result Value Ref Range    Protein (POC) Negative Negative    Glucose, urine (POC) Negative Negative    Ketones (POC) 15 mg/dL Negative   CBC W/O DIFF    Collection Time: 19  2:56 PM   Result Value Ref Range    WBC 14.5 (H) 4.3 - 11.1 K/uL    RBC 3.53 (L) 4.05 - 5.2 M/uL    HGB 11.7 11.7 - 15.4 g/dL    HCT 34.1 (L) 35.8 - 46.3 %    MCV 96.6 79.6 - 97.8 FL    MCH 33.1 (H) 26.1 - 32.9 PG    MCHC 34.3 31.4 - 35.0 g/dL    RDW 14.5 11.9 - 14.6 %    PLATELET 157 769 - 824 K/uL    MPV 11.2 9.4 - 12.3 FL    ABSOLUTE NRBC 0.00 0.0 - 0.2 K/uL   METABOLIC PANEL, COMPREHENSIVE    Collection Time: 19  2:56 PM   Result Value Ref Range    Sodium 139 136 - 145 mmol/L    Potassium 3.9 3.5 - 5.1 mmol/L    Chloride 107 98 - 107 mmol/L    CO2 24 21 - 32 mmol/L    Anion gap 8 7 - 16 mmol/L    Glucose 64 (L) 65 - 100 mg/dL    BUN 7 6 - 23 MG/DL    Creatinine 0.54 (L) 0.6 - 1.0 MG/DL    GFR est AA >60 >60 ml/min/1.73m2    GFR est non-AA >60 >60 ml/min/1.73m2    Calcium 8.6 8.3 - 10.4 MG/DL    Bilirubin, total 0.4 0.2 - 1.1 MG/DL    ALT (SGPT) 21 12 - 65 U/L    AST (SGOT) 15 15 - 37 U/L    Alk.  phosphatase 188 (H) 50 - 130 U/L    Protein, total 6.8 6.3 - 8.2 g/dL    Albumin 2.5 (L) 3.5 - 5.0 g/dL    Globulin 4.3 (H) 2.3 - 3.5 g/dL    A-G Ratio 0.6 (L) 1.2 - 3.5     FETAL FIBRONECTIN    Collection Time: 19  3:01 PM   Result Value Ref Range    Fetal fibronectin NEGATIVE  NEG         I personally reviewed pt's medical record including relevant labs and ultrasounds  I reviewed the NST at today's encounter    Assessment/Plan:  28 yo  at 30w6d with viral gastroenteritis- improved with iv fluids and zofran, tolerating po at time of discharge  -  contractions- ongoing problem for this pt; no cervical change, neg FFN; cervical length greater than 4 cm  Home with precautions

## 2019-09-24 NOTE — PROGRESS NOTES
Pt to room OB1 for triage with chief complaint of N&V unable to keep fluids down, took a prcardia for  contractions. . Assessment begins, EFM and [place] applied to a soft non tender abdomin and tracing well.

## 2019-10-03 PROBLEM — Z23 ENCOUNTER FOR IMMUNIZATION: Status: ACTIVE | Noted: 2019-10-03

## 2019-10-10 PROBLEM — O26.899 ANTEPARTUM DEHYDRATION: Status: RESOLVED | Noted: 2019-09-24 | Resolved: 2019-10-10

## 2019-10-10 PROBLEM — E86.0 ANTEPARTUM DEHYDRATION: Status: RESOLVED | Noted: 2019-09-24 | Resolved: 2019-10-10

## 2019-10-10 PROBLEM — O99.891 PREGNANCY COMPLICATED BY UMBILICAL CORD VARIX IN ANTEPARTUM PERIOD: Status: ACTIVE | Noted: 2019-10-10

## 2019-10-14 ENCOUNTER — HOSPITAL ENCOUNTER (INPATIENT)
Age: 29
LOS: 4 days | Discharge: HOME | End: 2019-10-18
Attending: OBSTETRICS & GYNECOLOGY | Admitting: OBSTETRICS & GYNECOLOGY
Payer: COMMERCIAL

## 2019-10-14 DIAGNOSIS — O36.8230 FETAL ANEMIA AFFECTING MANAGEMENT OF PREGNANCY IN THIRD TRIMESTER, SINGLE OR UNSPECIFIED FETUS: ICD-10-CM

## 2019-10-14 LAB
ABO + RH BLD: NORMAL
BACTERIA SPEC CULT: NORMAL
BLOOD GROUP ANTIBODIES SERPL: NORMAL
ERYTHROCYTE [DISTWIDTH] IN BLOOD BY AUTOMATED COUNT: 13.6 % (ref 11.9–14.6)
HCT VFR BLD AUTO: 36 % (ref 35.8–46.3)
HGB BLD-MCNC: 12.1 G/DL (ref 11.7–15.4)
MCH RBC QN AUTO: 32.5 PG (ref 26.1–32.9)
MCHC RBC AUTO-ENTMCNC: 33.6 G/DL (ref 31.4–35)
MCV RBC AUTO: 96.8 FL (ref 79.6–97.8)
NRBC # BLD: 0 K/UL (ref 0–0.2)
PLATELET # BLD AUTO: 202 K/UL (ref 150–450)
PMV BLD AUTO: 11.5 FL (ref 9.4–12.3)
RBC # BLD AUTO: 3.72 M/UL (ref 4.05–5.2)
RPR SER QL: NONREACTIVE
SERVICE CMNT-IMP: NORMAL
SPECIMEN EXP DATE BLD: NORMAL
WBC # BLD AUTO: 10.3 K/UL (ref 4.3–11.1)

## 2019-10-14 PROCEDURE — 86644 CMV ANTIBODY: CPT

## 2019-10-14 PROCEDURE — 86777 TOXOPLASMA ANTIBODY: CPT

## 2019-10-14 PROCEDURE — 74011250636 HC RX REV CODE- 250/636: Performed by: OBSTETRICS & GYNECOLOGY

## 2019-10-14 PROCEDURE — 36415 COLL VENOUS BLD VENIPUNCTURE: CPT

## 2019-10-14 PROCEDURE — 86694 HERPES SIMPLEX NES ANTBDY: CPT

## 2019-10-14 PROCEDURE — 85027 COMPLETE CBC AUTOMATED: CPT

## 2019-10-14 PROCEDURE — 59025 FETAL NON-STRESS TEST: CPT | Performed by: OBSTETRICS & GYNECOLOGY

## 2019-10-14 PROCEDURE — 65270000029 HC RM PRIVATE

## 2019-10-14 PROCEDURE — 86900 BLOOD TYPING SEROLOGIC ABO: CPT

## 2019-10-14 PROCEDURE — 74011250637 HC RX REV CODE- 250/637: Performed by: OBSTETRICS & GYNECOLOGY

## 2019-10-14 PROCEDURE — 87653 STREP B DNA AMP PROBE: CPT

## 2019-10-14 PROCEDURE — 87081 CULTURE SCREEN ONLY: CPT

## 2019-10-14 PROCEDURE — 86592 SYPHILIS TEST NON-TREP QUAL: CPT

## 2019-10-14 RX ORDER — ZOLPIDEM TARTRATE 5 MG/1
5 TABLET ORAL
Status: DISCONTINUED | OUTPATIENT
Start: 2019-10-14 | End: 2019-10-16

## 2019-10-14 RX ORDER — SODIUM CHLORIDE 0.9 % (FLUSH) 0.9 %
5-40 SYRINGE (ML) INJECTION EVERY 8 HOURS
Status: DISCONTINUED | OUTPATIENT
Start: 2019-10-14 | End: 2019-10-16

## 2019-10-14 RX ORDER — BETAMETHASONE SODIUM PHOSPHATE AND BETAMETHASONE ACETATE 3; 3 MG/ML; MG/ML
12 INJECTION, SUSPENSION INTRA-ARTICULAR; INTRALESIONAL; INTRAMUSCULAR; SOFT TISSUE ONCE
Status: COMPLETED | OUTPATIENT
Start: 2019-10-14 | End: 2019-10-14

## 2019-10-14 RX ORDER — SODIUM CHLORIDE 0.9 % (FLUSH) 0.9 %
5-40 SYRINGE (ML) INJECTION AS NEEDED
Status: DISCONTINUED | OUTPATIENT
Start: 2019-10-14 | End: 2019-10-16

## 2019-10-14 RX ADMIN — BETAMETHASONE SODIUM PHOSPHATE AND BETAMETHASONE ACETATE 12 MG: 3; 3 INJECTION, SUSPENSION INTRA-ARTICULAR; INTRALESIONAL; INTRAMUSCULAR at 11:09

## 2019-10-14 RX ADMIN — ZOLPIDEM TARTRATE 5 MG: 5 TABLET ORAL at 22:20

## 2019-10-14 NOTE — H&P
MFM Consultation and Admission History and Physical    CC:   Chief Complaint   Patient presents with    Ultrasound    High Risk OB     Hx of PTD at 33w, Depression/Anxiety, Uterine Anomaly, Polyhydramnios (QID testing)       HPI: 34y.o. year old  at 35w2d with Estimated Date of Delivery: 19 who presents for follow up MFM consultation. History of  Delivery at 33 weeks, Subchorionic Hemorrhage, and Depression/Anxiety.  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 Now with Polyhydramnios (QID testing).     No HAs. Reports edema, L>R, trace seen today.  Denies preeclamptic symptoms. Reports good fetal movement. No bleeding, LOF, or cramping.  Was treated for increased in contractions at [facility] on  with Brethine x 1 and d/c home on Procardia 10 mg TID prn. Has not used the procardia. Reports at least 2 contractions/hour with increasing intensity. Reports vaginal pressure. Patient works as a . Scheduled to see primary OB ([facility]) on 10/17/2019.     No changes to PMH, PSH, FHx since last visit. Medications: I have reviewed medication list in the chart    Current Outpatient Medications:     calcium carbonate (TUMS) 200 mg calcium (500 mg) chew, Take 2 Tabs by mouth as needed. , Disp: , Rfl:     Blood-Glucose Meter monitoring kit, Dispense 1; No refills, Disp: 1 Kit, Rfl: 0    glucose blood VI test strips (BLOOD GLUCOSE TEST) strip, by Does Not Apply route five (5) times daily. Dispense 150, 5 Refills, Disp: 150 Strip, Rfl: 5    lancets misc, by Not Applicable route five (5) times daily. 5 daily; dispense 150 with 5 refills, Disp: 150 Each, Rfl: 5    NIFEdipine (PROCARDIA) 10 mg capsule, Take 1 Cap by mouth three (3) times daily.  Indications: early labor, Disp: 36 Cap, Rfl: 1    OTHER, 17OHP 250mg/ml [phone], Disp: 6 mL, Rfl: 5    citalopram (CELEXA) 40 mg tablet, Take 1 Tab by mouth daily., Disp: 90 Tab, Rfl: 3    acetaminophen (TYLENOL EXTRA STRENGTH) 500 mg tablet, Take  by mouth every six (6) hours as needed for Pain., Disp: , Rfl:     cetirizine (ZYRTEC) 10 mg tablet, Take  by mouth., Disp: , Rfl:     PNV66-Iron Fumarate-FA-DSS-DHA 26-1.2- mg cap, Take  by mouth., Disp: , Rfl:     Allergies: I have reviewed allergy section in the chart  Allergies   Allergen Reactions    Cephalosporins Hives         There were no vitals filed for this visit. Ultrasound: Please see formal ultrasound report under media tab. Labs:   Recent Labs     19  1456 09/10/19  1636 19   WBC 14.5*  --   --    HGB 11.7 10.9*  --      --   --    SGOT 15  --   --    ALT 21  --   --    HGBEXT  --   --  12.6   PLTEXT  --   --  266         Assessment/Plan  34 y.o.  at 34w2d with   Patient Active Problem List    Diagnosis    Pregnancy complicated by umbilical cord varix in antepartum period     10/10/2019 [facility]: stable from prior. Intraabdominal, no turbulent flow. MCA borderline. Repeat MCA with MFM in 4 days. If fetal anemia, probable delivery. 10/14/2019 at [facility]: MCA Doppler PSV more elevated today, c/w worsening fetal anemia. BPP-8/8, severe Polyhydramnios. Recommend admitting for steroids then induction after 48 hours.  High-risk pregnancy in third trimester     NIPT testing  28 week glucola ___  2019 at [facility]:  Normal NT, NB present. Genetic counseling done by physician. Pt declines testing. Bicornuate of Septate uterus noted, may explain previous PTD. Finding explained to the patient. Will follow cervical lengths. 2019 at [facility]:  Normal early anatomy. 2019 at [facility]: Appropriate fetal growth, slight accelerated. AC 82%, MICHELE: WNL. Normal cervical length of 4.0 cm/ MP complaints. :  CL 4.6cm. No funneling. 2019 at [facility]:  Appropriate fetal growth, Severe Polyhydramnios noted today; reassuring fetal status.   AC 51%, overall 60%, MICHELE 38.8 cm (DVP 10.8), UA Dopplers WNL, BPP 8/8. Abnormal dilation of umbilical vein (Varix) noted in fetal abdomen at insertion of umbilical cord. Unclear affect on fetus. Polyhydramnios may require amnio-reduction if patient has symptoms of SOB. 10/10/2019 at [facility]: Appropriate fetal growth, Severe Polyhydramnios noted today; reassuring fetal status, borderline upper limits MCA. AC 24%, overall 47%, MICHELE 41.5 cm (DVP 11.9 cm), UA Dopplers WNL, BPP 8/8.    10/14/2019 at [facility]: Admit for induction after course of steroids. Patient with frequent contractions.   uterine contractions, antepartum, third trimester    Polyhydramnios affecting pregnancy     2019 at [facility]:  MICHELE 38.8 cm (DVP 10.8). Passed 1 hour glucola (104). Discussed checking QID BG's due to increased MICHELE. Taught how to check BG's; info packet given. 2019: Glucose log reviewed. Ranges from 82 to 173. A few PP elevations. Continue QID testing. 10/7/2019: Glucose log reviewed. Ranges from 82 to 149. More than half of PPs are mildly elevated. 10/10/2019 at [facility]: Severe, 41.5 cm. Glucose log reviewed. Ranges from 99 to 132. Just a few mild elevations, improved control. No pathologic reason obvious. · Continue BG BID testing (FBS, 1hPP rotating)  · Rxs ePrescribed 2019 for testing supplies (glucometer, test strips, and lancets) with enough refills for entire pregnancy. · If BG's elevations, primary OB to refer to [facility]. · Decrease carbohydrates. · If elevations, discussed adding PO meds or insulin. Continue weekly evaluation; if otherwise stable, deliver 38wk due to extent of poly. See High Risk Pregnancy Overview      History of  delivery, currently pregnant, third trimester     Per patient  labor started around 30 weeks with 2016 pregnancy; delivered at 33 weeks  2019 at [facility]:  Patient with history of PTD at 33 weeks.   States PTL started at 31 weeks; treated with IV Mg in hospital.  2019 at [facility]:  CL stable at 3.2 cm. No PTL symptoms. Received 1st 17P injection today at [facility];  will administer. 2019 at [facility]: CL stable at 3.95 cm. No PTL symptoms. 2019 at [facility]:  CL stable at 3.7 cm. See PTL Overview for more details. · Continue 17P weekly to 37 weeks. See High Risk Pregnancy Overview       labor in third trimester without delivery     2019 at [facility]:  CL 3.7 cm; stable. Patient with increased ctxs 2 weeks ago; went to [facility] for evaluation. Treated with Brethine x 1 and d/c home on Procardia 10 mg TID prn. Pt states hasn't picked up from pharmacy yet (initially had issues with insurance; planning to get today). Reports non-painful \"tightening\" ranging from 3-8x's/hour only 1-2 hrs/day. · Continue Procardia prn for symptoms. · PTL and cervical insufficiency precautions given. · No additional CL's indicated. See High Risk Pregnancy Overview      Uterus bicornis affecting pregnancy in third trimester     2019 at [facility]:  Partial septate uterus vs bicornuate uterus. 2019 [facility]: has never had renal evaluation, recommend post-terri evaluation       See High Risk Pregnancy Overview      Depression affecting pregnancy     Depression/Anxiety  Currently taking Celexa 40mg daily prescribed by Dr. Ramirez Roles  2019 at [facility]:  Patient with depression/anxiety; currently taking Celexa 40 mg daily. Stable. Denies SI/HI. 2019 at [facility]:  Stable on Celexa 40 mg daily. Denies SI/HI.  2019 at [facility]: Stable on Celexa 40 mg daily. Denies SI/HI. 2019 at [facility]:  Stable on Celexa 40 mg daily; denies SI/HI. 10/10/2019 at [facility]: Stable on Celexa 40 mg daily; denies SI/HI. · Adjust medication prn for symptoms. See High Risk Pregnancy Overview      Encounter for immunization     10/02/19- Flu Vaccine given       **See ultrasound report in media tab for further details.     Follow-up and Dispositions    · Return for No patient follow up scheduled at [facility] office. Jacques Shone, MD  10/14/2019    Total time caring for the patient was 40 minutes. Of this, over 50% was spent in counseling and/or care coordination with patient, family/support regarding the above described findings and concerns.

## 2019-10-14 NOTE — CONSULTS
Neonatology Antepartum Consultation    Name: Narendra Sy      Medical Record Number: [id number]      YOB: 1990     Today's Date: 2019                                                                 Date of Consultation:  2019  Time: 4:44 PM    Referring Physician: Dr. Terrie Lee  Reason for Consultation:  34 weeks, intraabdominal umbilical vein varix with increased MCA doppler    Subjective:     Age: 34 y.o.  Karina Cords: , previous 35 week infant born at [facility] and transferred to [facility]  Gestation age: 35w2d   First noted intraabdominal umbilical vein varix in September, now with increased MCA doppler consistent with fetal anemia. BPP 8/. Polyhydramnios. TORCH titers pending, prenatal labs normal/negative     Maternal steroids:  10/14, second dose tomorrow     Objective:     Medications:   Current Facility-Administered Medications   Medication Dose Route Frequency    sodium chloride (NS) flush 5-40 mL  5-40 mL IntraVENous Q8H    sodium chloride (NS) flush 5-40 mL  5-40 mL IntraVENous PRN    diph,Pertuss(AC),Tet Vac-PF (BOOSTRIX) suspension 0.5 mL  0.5 mL IntraMUSCular PRIOR TO DISCHARGE     Rupture of Membrane: Membranes  Membrane Status: Intact   Meconium Stained:       Data Review:  Lab:   Lab Results   Component Value Date/Time    ABO/Rh(D) B POSITIVE 10/14/2019 11:07 AM    Antibody screen NEG 10/14/2019 11:07 AM    Rubella, External Immune 2019    HBsAg, External Negative 2019    HIV, External Non-Reactive  2019    RPR, External Non-Reactive 2019    Gonorrhea, External negative 2019    Chlamydia, External negative 2019    ABO,Rh B POS 2015    Antibody screen, External Negative 2019       Assessment:      Principal Problem:    Pregnancy complicated by umbilical cord varix in antepartum period (10/10/2019)      Overview: 10/10/2019 [facility]: stable from prior. Intraabdominal, no turbulent flow.  MCA borderline. Repeat MCA with MFM in 4 days. If fetal anemia, probable delivery. 10/14/2019 at [facility]: MCA Doppler PSV more elevated today, c/w worsening       fetal anemia. BPP-8/8, severe Polyhydramnios. Recommend admitting for       steroids then induction after 48 hours. Active Problems:    Fetal anemia affecting management of mother in third trimester (10/14/2019)        OB Concerns/Plan:     I met with Ms. Jarred Guillen and her parents in her room. We discussed prematurity at 34 weeks and the diagnosis of intraabdominal umbilical vein varix. I explained what to expect in the delivery room, possible need for respiratory support, IV fluids, breast milk feeds & pumping, possible need for antibiotics, NICU team with 24 hr neonatologists & nurses, visitation to NICU and private rooms with ability for a parent to sleep in, and average length of stay. We reviewed the possibilities with the umbilical vein varix which includes an abdominal ultrasound with doppler, screening labs and possible need for blood products. I also discussed long term outcomes at this age. I answered their questions and gave them my card. Please feel free to let us know if they have any more questions. Thank you for this consult. I have spent 55 minutes on this consult with the majority of the time spent with the patient. I also spoke with the physician, the nurse, and reviewed the medical record and the medical literature on this topic.        Signed: Daryle Forth, MD  Date: 10/14/19  Time: 4:53pm

## 2019-10-14 NOTE — PROGRESS NOTES
MFM      With severe polyhydramnios and fetal anemia, will check for TORCH infection upon hospital admission. (RPR, toxo, CMV, HSV)    Pt previously documented to be rubella and varicella immune, will not recheck these at this time.        Marina Moore MD

## 2019-10-14 NOTE — PROGRESS NOTES
Patient complaining of some mild contractions at this time. Patient placed on monitor for NST. Initially kiki, but spacing now. Baby very active at this time. WIll continue to monitor the patient.

## 2019-10-15 LAB
CMV IGG SERPL IA-ACNC: 9.5 U/ML (ref 0–0.59)
CMV IGM SERPL IA-ACNC: <30 AU/ML (ref 0–29.9)
COMMENT, 006485: NORMAL
ERYTHROCYTE [DISTWIDTH] IN BLOOD BY AUTOMATED COUNT: 13.5 % (ref 11.9–14.6)
HCT VFR BLD AUTO: 35.1 % (ref 35.8–46.3)
HGB BLD-MCNC: 12 G/DL (ref 11.7–15.4)
HSV1 IGG SER QL: NORMAL
HSV1+2 IGM SER IA-ACNC: <0.91 RATIO (ref 0–0.9)
MCH RBC QN AUTO: 32.9 PG (ref 26.1–32.9)
MCHC RBC AUTO-ENTMCNC: 34.2 G/DL (ref 31.4–35)
MCV RBC AUTO: 96.2 FL (ref 79.6–97.8)
NRBC # BLD: 0.03 K/UL (ref 0–0.2)
PLATELET # BLD AUTO: 242 K/UL (ref 150–450)
PMV BLD AUTO: 12 FL (ref 9.4–12.3)
RBC # BLD AUTO: 3.65 M/UL (ref 4.05–5.2)
T GONDII IGG SERPL IA-ACNC: <3 IU/ML (ref 0–7.1)
T GONDII IGM SER IA-ACNC: <3 AU/ML (ref 0–7.9)
WBC # BLD AUTO: 12.8 K/UL (ref 4.3–11.1)

## 2019-10-15 PROCEDURE — 74011250636 HC RX REV CODE- 250/636: Performed by: OBSTETRICS & GYNECOLOGY

## 2019-10-15 PROCEDURE — 74011250637 HC RX REV CODE- 250/637: Performed by: OBSTETRICS & GYNECOLOGY

## 2019-10-15 PROCEDURE — 99223 1ST HOSP IP/OBS HIGH 75: CPT | Performed by: OBSTETRICS & GYNECOLOGY

## 2019-10-15 PROCEDURE — 85027 COMPLETE CBC AUTOMATED: CPT

## 2019-10-15 PROCEDURE — 59025 FETAL NON-STRESS TEST: CPT

## 2019-10-15 PROCEDURE — 65270000029 HC RM PRIVATE

## 2019-10-15 RX ORDER — ACETAMINOPHEN 500 MG
1000 TABLET ORAL
Status: DISCONTINUED | OUTPATIENT
Start: 2019-10-15 | End: 2019-10-16

## 2019-10-15 RX ORDER — SODIUM CHLORIDE 0.9 % (FLUSH) 0.9 %
5-40 SYRINGE (ML) INJECTION EVERY 8 HOURS
Status: DISCONTINUED | OUTPATIENT
Start: 2019-10-15 | End: 2019-10-16

## 2019-10-15 RX ORDER — POLYETHYLENE GLYCOL 3350 17 G/17G
17 POWDER, FOR SOLUTION ORAL
Status: DISCONTINUED | OUTPATIENT
Start: 2019-10-15 | End: 2019-10-16

## 2019-10-15 RX ORDER — BETAMETHASONE SODIUM PHOSPHATE AND BETAMETHASONE ACETATE 3; 3 MG/ML; MG/ML
12 INJECTION, SUSPENSION INTRA-ARTICULAR; INTRALESIONAL; INTRAMUSCULAR; SOFT TISSUE
Status: COMPLETED | OUTPATIENT
Start: 2019-10-15 | End: 2019-10-15

## 2019-10-15 RX ORDER — BETAMETHASONE SODIUM PHOSPHATE AND BETAMETHASONE ACETATE 3; 3 MG/ML; MG/ML
12 INJECTION, SUSPENSION INTRA-ARTICULAR; INTRALESIONAL; INTRAMUSCULAR; SOFT TISSUE
Status: DISCONTINUED | OUTPATIENT
Start: 2019-10-15 | End: 2019-10-15

## 2019-10-15 RX ORDER — ONDANSETRON 4 MG/1
4 TABLET, ORALLY DISINTEGRATING ORAL
Status: DISCONTINUED | OUTPATIENT
Start: 2019-10-15 | End: 2019-10-16

## 2019-10-15 RX ORDER — FAMOTIDINE 20 MG/1
20 TABLET, FILM COATED ORAL
Status: DISCONTINUED | OUTPATIENT
Start: 2019-10-15 | End: 2019-10-16

## 2019-10-15 RX ORDER — SODIUM CHLORIDE 0.9 % (FLUSH) 0.9 %
5-40 SYRINGE (ML) INJECTION AS NEEDED
Status: DISCONTINUED | OUTPATIENT
Start: 2019-10-15 | End: 2019-10-16

## 2019-10-15 RX ORDER — MISOPROSTOL 100 UG/1
50 TABLET ORAL EVERY 4 HOURS
Status: DISCONTINUED | OUTPATIENT
Start: 2019-10-15 | End: 2019-10-16

## 2019-10-15 RX ADMIN — FAMOTIDINE 20 MG: 20 TABLET ORAL at 09:09

## 2019-10-15 RX ADMIN — ACETAMINOPHEN 1000 MG: 500 TABLET, FILM COATED ORAL at 15:03

## 2019-10-15 RX ADMIN — BETAMETHASONE SODIUM PHOSPHATE AND BETAMETHASONE ACETATE 12 MG: 3; 3 INJECTION, SUSPENSION INTRA-ARTICULAR; INTRALESIONAL; INTRAMUSCULAR at 11:41

## 2019-10-15 RX ADMIN — POLYETHYLENE GLYCOL (3350) 17 G: 17 POWDER, FOR SOLUTION ORAL at 14:44

## 2019-10-15 NOTE — LACTATION NOTE
Lactation visit. Antepartum, anticipates induction tomorrow, baby will be 34 weeks gestation. Baby will be in SCN. Mom had prior  infant at 35 weeks and pumped x 6 weeks for that infant. Had copious milk supply and baby had breastmilk x 4 months. Mom wanted to begin prenatal pumping today so baby would have colostrum. Per Dr Jillian Howard, mom can pump 2x per day. Mom familiar with pump and pump settings. Assisted mom to pump. Showed mom hands on pumping technique. Collection of colostrum and labeling for NCU reviewed. Collected 2ml. Reviewed normal colostrum volume expectations. Mom will pump x 1 tonight and hopefully tomorrow morning also. Will plan to pump as soon after delivery as able.  Lactation to continue to follow closely and assist.

## 2019-10-15 NOTE — PROGRESS NOTES
SW assessment- Patient admitted on 10/14/19 due to anemia. 34w2d on admission; NAILA: 11/23/19. Patient states that she's doing well and feels comfortable about scheduled induction tomorrow. Patient reports having a strong support system consisting of her  and family. Patient's first child was born at 32w3d (2016) and required a transfer to [facility] after delivery, so she and her  are familiar with having a child in the NICU. Patient is currently on 40mg of Celexa for depression and anxiety. She confirms experiencing postpartum depression with her first child (2016). At that time, her dose was increased from 20mg to 40mg. She has remained on 40mg. Patient states that the Celexa manages her symptoms well. Patient denied any needs from  at this time. SW will meet with patient after delivery.     PAM Grant-VAL  [facility]   [phone]

## 2019-10-15 NOTE — PROGRESS NOTES
Dr. Danyelle Jones updated on patient at this time. Patient requesting Asha Kang to rest tonight. Orders received for 5mg of Ambien prn at bedtime.

## 2019-10-15 NOTE — PROCEDURES
NST:  · Indications:   Fetal anemia of unknown etiology  · Time On: 1030 10/14/19  · Time Off:  1430 10/14/19  · Results:  Reactive   · FHR Baseline Rate:  120  · Periodic Changes: Mod variability, no sinusoidal concerns. Reactive. No decels  · Comments:  Reassuring fetal status; repeat three times daily.        Lesa Olmstead MD

## 2019-10-15 NOTE — PROGRESS NOTES
10/15/19 1433   Cervical Exam   Dilation (cm) 2   Eff 50 %   Station -3     Orders received for Cytotec 50 mcg buccal every 4 hour per Dr. Lisette Fulton starting this evening.

## 2019-10-15 NOTE — PROCEDURES
NST:  · Indications:   Fetal anemia of unknown etiology  · Time On: 1915 10/14/19  · Time Off:  2020 10/14/19  · Results:  Reactive   · FHR Baseline Rate:  110  · Periodic Changes: Mod variability, no sinusoidal concerns. Reactive. No decels  · Comments:  Reassuring fetal status; repeat three times daily.      Maite Mahan MD

## 2019-10-15 NOTE — PROGRESS NOTES
High Risk Obstetrics Progress Note    Name: Leatha Singer MRN: [id number]  SSN: [id number]    YOB: 1990  Age: 34 y.o. Sex: female      Subjective:      LOS: 1 day    Estimated Date of Delivery: 19   Gestational Age Today: 26w3d     Patient admitted for fetal anemia and polyhydramnios. States she does have normal fetal movement and does not have abdominal pain  , chest pain, contractions, fever, headache , pelvic pressure, right upper quadrant pain  , shortness of breath, swelling, vaginal bleeding , vaginal leaking of fluid  and visual disturbances. Objective:     Vitals:  Blood pressure 132/74, pulse 80, temperature 98.7 °F (37.1 °C), resp. rate 16, height 5' 7\" (1.702 m), weight 73 kg (161 lb), last menstrual period 2019, SpO2 99 %, unknown if currently breastfeeding. Temp (24hrs), Av.4 °F (36.9 °C), Min:98 °F (36.7 °C), Max:98.7 °F (20.5 °C)    Systolic (96LPD), VGL:387 , Min:105 , FVF:251      Diastolic (10SQG), NA, Min:56, Max:74       Intake and Output:         Physical Exam:  Patient without distress.        Membranes:  Intact    Uterine Activity:  irregular    Fetal Heart Rate:  Reactive        Labs:   Recent Results (from the past 36 hour(s))   CBC W/O DIFF    Collection Time: 10/14/19 11:07 AM   Result Value Ref Range    WBC 10.3 4.3 - 11.1 K/uL    RBC 3.72 (L) 4.05 - 5.2 M/uL    HGB 12.1 11.7 - 15.4 g/dL    HCT 36.0 35.8 - 46.3 %    MCV 96.8 79.6 - 97.8 FL    MCH 32.5 26.1 - 32.9 PG    MCHC 33.6 31.4 - 35.0 g/dL    RDW 13.6 11.9 - 14.6 %    PLATELET 331 634 - 457 K/uL    MPV 11.5 9.4 - 12.3 FL    ABSOLUTE NRBC 0.00 0.0 - 0.2 K/uL   TYPE & SCREEN    Collection Time: 10/14/19 11:07 AM   Result Value Ref Range    Crossmatch Expiration 10/17/2019     ABO/Rh(D) B POSITIVE     Antibody screen NEG    TOXOPLASMA GONDII AB, IGG/IGM, QT    Collection Time: 10/14/19 11:10 AM   Result Value Ref Range    Toxoplasma gondii AB, IgG, QT PENDING IU/mL    Toxoplasma gondii Ab, IgM, QT PENDING AU/mL   RPR    Collection Time: 10/14/19 11:10 AM   Result Value Ref Range    RPR NONREACTIVE NR     CULTURE, GENITAL GROUP B STREP    Collection Time: 10/14/19 11:13 AM   Result Value Ref Range    Special Requests: NO SPECIAL REQUESTS      Culture result: SUBCULTURE IN PROGRESS     GRP B STREP SCRN BY PCR    Collection Time: 10/14/19 11:13 AM   Result Value Ref Range    Special Requests: NO SPECIAL REQUESTS      Culture result:        NEGATIVE: GBS target nucleic acid is not detected. Presumed not colonized for GBS. Assessment and Plan:      Principal Problem:    Pregnancy complicated by umbilical cord varix in antepartum period (10/10/2019)      Overview: 10/10/2019 [facility]: stable from prior. Intraabdominal, no turbulent flow. MCA       borderline. Repeat MCA with M in 4 days. If fetal anemia, probable delivery. 10/14/2019 at [facility]: MCA Doppler PSV more elevated today, c/w worsening       fetal anemia. BPP-8/8, severe Polyhydramnios. Recommend admitting for       steroids then induction after 48 hours. Active Problems:    Fetal anemia affecting management of mother in third trimester (10/14/2019)       Discussed with M this AM plan of care for unknown etiology fetal anemia with polyhydramnios. Will continue steroids as indicated. Move toward delivery tonight/tomorrow AM based on SVE. Will check cervix tonight and IOL tomorrow.

## 2019-10-15 NOTE — PROGRESS NOTES
09:09 Medication Given famotidine (PEPCID) tablet 20 mg -  Dose: 20 mg ; Route: Oral  Lambert Aguilera RN

## 2019-10-15 NOTE — PROGRESS NOTES
GBS culture pending. Pt has allergy to cephalosporins. Received PCN in 2016 with no reactions. Dr. Yusra Polo notified. Orders for PCN received.

## 2019-10-15 NOTE — PROGRESS NOTES
10/15/19 1309   Fetal Vital Signs   Mode External   Fetal Heart Rate 115   Fetal Activity Present   Variability 6-25 BPM   Decelerations None   Accelerations Yes   RN Reviewed Strip?  Yes   Non Stress Test Reactive   Uterine Activity   Mode External   Frequency (min) 0

## 2019-10-15 NOTE — PROGRESS NOTES
11:41 Medication Given betamethasone (CELESTONE) injection 12 mg -  Dose: 12 mg ; Route: IntraMUSCular ; Site: Right Upper Outer Quadrant ; Scheduled Time: Issa 22, Jori Woodson RN

## 2019-10-15 NOTE — PROGRESS NOTES
Dr Liliana Powers called to report SVE of 3cm. Dr. Liliana Powers ordered the patient to have NST then off monitor until am induction of pitocin at [facility]. Also ordered to start penicillin at that time. Plan of care discussed with patient. Patient verbalizes understanding and all questions answered by RN.

## 2019-10-15 NOTE — PROGRESS NOTES
AM assessment complete at this time per flow sheet. Vital signs stable. Patient denies headache, contractions, right upper quadrant pain, shortness of breath, vaginal bleeding, vaginal leaking of fluid and visual disturbances. Patient states positive fetal movement. Pt to call RN when ready for NST. Family at bedside. Encouraged to call as needed.

## 2019-10-15 NOTE — PROGRESS NOTES
MFM Antepartum Progress Note    34 y.o.  at 34w3d by Estimated Date of Delivery: 19. Patient admitted for fetal anemia. She complains of decreased fetal movement compared to normal. Patient denies HA, abdominal pain, vision changes. No regular contractions, LOF, VB. Good FM. Minimal edema. No chest pain or shortness of breath. No significant reflux, nausea, constipation, or other GI complaints. Review of Systems: A comprehensive review of systems was negative except for that written in the HPI. Current Facility-Administered Medications:     ondansetron (ZOFRAN ODT) tablet 4 mg, 4 mg, Oral, Q6H PRN, Eliseo Sifuentes MD    famotidine (PEPCID) tablet 20 mg, 20 mg, Oral, BID PRN, Vaibhav Thomas MD, 20 mg at 10/15/19 0909    polyethylene glycol (MIRALAX) packet 17 g, 17 g, Oral, BID PRN, Kelly Grant MD, 17 g at 10/15/19 1444    miSOPROStol (CYTOTEC) tablet (prepacked) 50 mcg, 50 mcg, Buccal, Q4H, Erin Strong MD    acetaminophen (TYLENOL) tablet 1,000 mg, 1,000 mg, Oral, Q6H PRN, Kelly Grant MD    sodium chloride (NS) flush 5-40 mL, 5-40 mL, IntraVENous, Q8H, Lisseth Cruz MD    sodium chloride (NS) flush 5-40 mL, 5-40 mL, IntraVENous, PRN, Vaibhav Thomas MD    diph,Pertuss(AC),Tet Vac-PF (BOOSTRIX) suspension 0.5 mL, 0.5 mL, IntraMUSCular, PRIOR TO DISCHARGE, Vaibhav Thomas MD    zolpidem (AMBIEN) tablet 5 mg, 5 mg, Oral, QHS PRN, Eliseo Sifuentes MD, 5 mg at 10/14/19 2220      Vitals:    Patient Vitals for the past 24 hrs:   BP   10/15/19 0902 132/74   10/14/19 1957 105/56     Temp (24hrs), Av.4 °F (36.9 °C), Min:98 °F (36.7 °C), Max:98.7 °F (37.1 °C)      I&O:  No intake/output data recorded. No intake/output data recorded. Exam:   Patient without distress.     Maternal and Fetal Monitoring:                              Uterine Activity: Frequency (min): 0 Intensity: Mild                         Fetal Heart Rate: Mode: ExternalFetal Heart Rate: 115          Labs:  CBC:    Recent Labs     10/14/19  1107 19  1456 09/10/19  1636 19  0935 19  1834   WBC 10.3 14.5*  --   --  7.8 8.9   HGB 12.1 11.7 10.9*  --  12.6 13.0   HCT 36.0 34.1*  --   --  36.1 37.8    227  --   --  266 229   HGBEXT  --   --   --  12.6  --   --    HCTEXT  --   --   --  36.1  --   --    PLTEXT  --   --   --  266  --   --        CMP:   Recent Labs     19  1456 19  1834    139   K 3.9 3.6    107   CO2 24 25   AGAP 8 7   GLU 64* 84   BUN 7 12   CREA 0.54* 0.69   GFRAA >60 >60   GFRNA >60 >60   CA 8.6 8.9   ALB 2.5*  --    TP 6.8  --    GLOB 4.3*  --    AGRAT 0.6*  --    SGOT 15  --    ALT 21  --      Recent Glucose Results:   Recent Labs     19  1456 19  1834   GLU 64* 84         Prenatal Labs:    Lab Results   Component Value Date/Time    Rubella, External Immune 2019    HBsAg, External Negative 2019    HIV, External Non-Reactive  2019    RPR, External Non-Reactive 2019    Gonorrhea, External negative 2019    Chlamydia, External negative 2019     A/P  34 y.o.  at 34w3d with   1) Fetal Anemia by elevated MCA  2) Severe Polyhydramnios of unknown etiology  3) Decreased fetal movement this am; reassuring fetal tracing. Unclear if known side effect of steroid course versus due to fetal condition. 4) Intraabdominal umbilical vein varix. No turbulent flow but increased dimensions, ?source of fetal anemia. Complete steroid course  Delivery at maturity- if requires ripening, begin this evening  Delivery sooner if fetal status worsens  Plan IOL  Continue TID fetal monitoring x 1hr each until begins ripening, continuous if any concerns. TORCH eval pending; RPR negative, known immune to VZV and Rubella. Neonatology has seen; patient aware of probable NICU course. Pt may begin  milk expression as great concern re establishment of milk supply.      Extensive discussion today re fetal status, lack of clarity re etiology. Recommendations for delivery and timing. All questions answered and concerns discussed. Time: 70  Minutes spent on floor,with greater than 50% of the time examining patient, explaining plan and coordinating care with nurse and requesting primary physician.

## 2019-10-16 ENCOUNTER — ANESTHESIA (OUTPATIENT)
Dept: LABOR AND DELIVERY | Age: 29
End: 2019-10-16
Payer: COMMERCIAL

## 2019-10-16 ENCOUNTER — ANESTHESIA EVENT (OUTPATIENT)
Dept: LABOR AND DELIVERY | Age: 29
End: 2019-10-16
Payer: COMMERCIAL

## 2019-10-16 LAB
ARTERIAL PATENCY WRIST A: ABNORMAL
BASE DEFICIT BLD-SCNC: 5 MMOL/L
BDY SITE: ABNORMAL
BODY TEMPERATURE: 98.6
CO2 BLD-SCNC: 22 MMOL/L
COLLECT TIME,HTIME: 1558
GAS FLOW.O2 O2 DELIVERY SYS: ABNORMAL L/MIN
HCO3 BLDV-SCNC: 20.8 MMOL/L (ref 23–28)
PCO2 BLDCO: 38 MMHG (ref 32–68)
PH BLDCO: 7.34 [PH] (ref 7.15–7.38)
PO2 BLDCO: 23 MMHG
SAO2 % BLDV: 36 % (ref 65–88)
SERVICE CMNT-IMP: ABNORMAL
SPECIMEN TYPE: ABNORMAL

## 2019-10-16 PROCEDURE — 75410000003 HC RECOV DEL/VAG/CSECN EA 0.5 HR

## 2019-10-16 PROCEDURE — 77030014125 HC TY EPDRL BBMI -B: Performed by: NURSE ANESTHETIST, CERTIFIED REGISTERED

## 2019-10-16 PROCEDURE — 3E033VJ INTRODUCTION OF OTHER HORMONE INTO PERIPHERAL VEIN, PERCUTANEOUS APPROACH: ICD-10-PCS | Performed by: OBSTETRICS & GYNECOLOGY

## 2019-10-16 PROCEDURE — 74011250636 HC RX REV CODE- 250/636

## 2019-10-16 PROCEDURE — 10907ZC DRAINAGE OF AMNIOTIC FLUID, THERAPEUTIC FROM PRODUCTS OF CONCEPTION, VIA NATURAL OR ARTIFICIAL OPENING: ICD-10-PCS | Performed by: OBSTETRICS & GYNECOLOGY

## 2019-10-16 PROCEDURE — 82803 BLOOD GASES ANY COMBINATION: CPT

## 2019-10-16 PROCEDURE — 0UC97ZZ EXTIRPATION OF MATTER FROM UTERUS, VIA NATURAL OR ARTIFICIAL OPENING: ICD-10-PCS | Performed by: OBSTETRICS & GYNECOLOGY

## 2019-10-16 PROCEDURE — 74011250637 HC RX REV CODE- 250/637: Performed by: OBSTETRICS & GYNECOLOGY

## 2019-10-16 PROCEDURE — 77030018846 HC SOL IRR STRL H20 ICUM -A

## 2019-10-16 PROCEDURE — 65270000029 HC RM PRIVATE

## 2019-10-16 PROCEDURE — A4300 CATH IMPL VASC ACCESS PORTAL: HCPCS | Performed by: NURSE ANESTHETIST, CERTIFIED REGISTERED

## 2019-10-16 PROCEDURE — 4A1HXCZ MONITORING OF PRODUCTS OF CONCEPTION, CARDIAC RATE, EXTERNAL APPROACH: ICD-10-PCS | Performed by: OBSTETRICS & GYNECOLOGY

## 2019-10-16 PROCEDURE — 74011000258 HC RX REV CODE- 258: Performed by: OBSTETRICS & GYNECOLOGY

## 2019-10-16 PROCEDURE — 75410000000 HC DELIVERY VAGINAL/SINGLE

## 2019-10-16 PROCEDURE — 74011250636 HC RX REV CODE- 250/636: Performed by: OBSTETRICS & GYNECOLOGY

## 2019-10-16 PROCEDURE — 77030011943

## 2019-10-16 PROCEDURE — 75410000002 HC LABOR FEE PER 1 HR

## 2019-10-16 PROCEDURE — 76060000078 HC EPIDURAL ANESTHESIA

## 2019-10-16 PROCEDURE — 77030034696 HC CATH URETH FOL 2W BARD -A

## 2019-10-16 PROCEDURE — 77030011945 HC CATH URIN INT ST MENT -A

## 2019-10-16 PROCEDURE — 77030005518 HC CATH URETH FOL 2W BARD -B

## 2019-10-16 PROCEDURE — 74011000250 HC RX REV CODE- 250

## 2019-10-16 RX ORDER — LIDOCAINE HYDROCHLORIDE AND EPINEPHRINE 15; 5 MG/ML; UG/ML
INJECTION, SOLUTION EPIDURAL AS NEEDED
Status: DISCONTINUED | OUTPATIENT
Start: 2019-10-16 | End: 2019-10-21 | Stop reason: HOSPADM

## 2019-10-16 RX ORDER — METHYLERGONOVINE MALEATE 0.2 MG/ML
INJECTION INTRAVENOUS
Status: COMPLETED
Start: 2019-10-16 | End: 2019-10-16

## 2019-10-16 RX ORDER — OXYTOCIN/RINGER'S LACTATE 15/250 ML
500 PLASTIC BAG, INJECTION (ML) INTRAVENOUS ONCE
Status: CANCELLED | OUTPATIENT
Start: 2019-10-16 | End: 2019-10-16

## 2019-10-16 RX ORDER — SIMETHICONE 80 MG
80 TABLET,CHEWABLE ORAL
Status: DISCONTINUED | OUTPATIENT
Start: 2019-10-16 | End: 2019-10-18 | Stop reason: HOSPADM

## 2019-10-16 RX ORDER — LIDOCAINE HYDROCHLORIDE 10 MG/ML
1 INJECTION INFILTRATION; PERINEURAL
Status: DISCONTINUED | OUTPATIENT
Start: 2019-10-16 | End: 2019-10-16 | Stop reason: HOSPADM

## 2019-10-16 RX ORDER — IBUPROFEN 800 MG/1
800 TABLET ORAL
Status: DISCONTINUED | OUTPATIENT
Start: 2019-10-16 | End: 2019-10-18 | Stop reason: HOSPADM

## 2019-10-16 RX ORDER — HYDROCODONE BITARTRATE AND ACETAMINOPHEN 5; 325 MG/1; MG/1
1 TABLET ORAL
Status: DISCONTINUED | OUTPATIENT
Start: 2019-10-16 | End: 2019-10-18 | Stop reason: HOSPADM

## 2019-10-16 RX ORDER — ROPIVACAINE HYDROCHLORIDE 2 MG/ML
INJECTION, SOLUTION EPIDURAL; INFILTRATION; PERINEURAL AS NEEDED
Status: DISCONTINUED | OUTPATIENT
Start: 2019-10-16 | End: 2019-10-21 | Stop reason: HOSPADM

## 2019-10-16 RX ORDER — ZOLPIDEM TARTRATE 5 MG/1
5 TABLET ORAL
Status: DISCONTINUED | OUTPATIENT
Start: 2019-10-16 | End: 2019-10-18 | Stop reason: HOSPADM

## 2019-10-16 RX ORDER — DEXTROSE, SODIUM CHLORIDE, SODIUM LACTATE, POTASSIUM CHLORIDE, AND CALCIUM CHLORIDE 5; .6; .31; .03; .02 G/100ML; G/100ML; G/100ML; G/100ML; G/100ML
125 INJECTION, SOLUTION INTRAVENOUS CONTINUOUS
Status: CANCELLED | OUTPATIENT
Start: 2019-10-16

## 2019-10-16 RX ORDER — ROPIVACAINE HYDROCHLORIDE 2 MG/ML
INJECTION, SOLUTION EPIDURAL; INFILTRATION; PERINEURAL
Status: DISCONTINUED | OUTPATIENT
Start: 2019-10-16 | End: 2019-10-21 | Stop reason: HOSPADM

## 2019-10-16 RX ORDER — MINERAL OIL
120 OIL (ML) ORAL
Status: COMPLETED | OUTPATIENT
Start: 2019-10-16 | End: 2019-10-16

## 2019-10-16 RX ORDER — METHYLERGONOVINE MALEATE 0.2 MG/ML
0.2 INJECTION INTRAVENOUS ONCE
Status: DISCONTINUED | OUTPATIENT
Start: 2019-10-16 | End: 2019-10-16

## 2019-10-16 RX ORDER — SODIUM CHLORIDE 0.9 % (FLUSH) 0.9 %
5-40 SYRINGE (ML) INJECTION AS NEEDED
Status: CANCELLED | OUTPATIENT
Start: 2019-10-16

## 2019-10-16 RX ORDER — DEXTROSE, SODIUM CHLORIDE, SODIUM LACTATE, POTASSIUM CHLORIDE, AND CALCIUM CHLORIDE 5; .6; .31; .03; .02 G/100ML; G/100ML; G/100ML; G/100ML; G/100ML
125 INJECTION, SOLUTION INTRAVENOUS CONTINUOUS
Status: DISCONTINUED | OUTPATIENT
Start: 2019-10-16 | End: 2019-10-16

## 2019-10-16 RX ORDER — LIDOCAINE HYDROCHLORIDE 20 MG/ML
INJECTION, SOLUTION EPIDURAL; INFILTRATION; INTRACAUDAL; PERINEURAL AS NEEDED
Status: DISCONTINUED | OUTPATIENT
Start: 2019-10-16 | End: 2019-10-21 | Stop reason: HOSPADM

## 2019-10-16 RX ORDER — CITALOPRAM 20 MG/1
40 TABLET, FILM COATED ORAL DAILY
Status: DISCONTINUED | OUTPATIENT
Start: 2019-10-17 | End: 2019-10-18 | Stop reason: HOSPADM

## 2019-10-16 RX ORDER — LIDOCAINE HYDROCHLORIDE 20 MG/ML
JELLY TOPICAL
Status: DISCONTINUED | OUTPATIENT
Start: 2019-10-16 | End: 2019-10-16 | Stop reason: HOSPADM

## 2019-10-16 RX ORDER — SODIUM CHLORIDE 0.9 % (FLUSH) 0.9 %
5-40 SYRINGE (ML) INJECTION EVERY 8 HOURS
Status: CANCELLED | OUTPATIENT
Start: 2019-10-16

## 2019-10-16 RX ORDER — BUTORPHANOL TARTRATE 2 MG/ML
1 INJECTION INTRAMUSCULAR; INTRAVENOUS
Status: DISCONTINUED | OUTPATIENT
Start: 2019-10-16 | End: 2019-10-16 | Stop reason: HOSPADM

## 2019-10-16 RX ORDER — OXYTOCIN/RINGER'S LACTATE 30/500 ML
1-25 PLASTIC BAG, INJECTION (ML) INTRAVENOUS
Status: DISCONTINUED | OUTPATIENT
Start: 2019-10-16 | End: 2019-10-16

## 2019-10-16 RX ORDER — METHYLERGONOVINE MALEATE 0.2 MG/1
200 TABLET ORAL EVERY 6 HOURS
Status: DISCONTINUED | OUTPATIENT
Start: 2019-10-16 | End: 2019-10-17 | Stop reason: ALTCHOICE

## 2019-10-16 RX ADMIN — LIDOCAINE HYDROCHLORIDE 5 ML: 20 INJECTION, SOLUTION EPIDURAL; INFILTRATION; INTRACAUDAL; PERINEURAL at 12:41

## 2019-10-16 RX ADMIN — IBUPROFEN 800 MG: 800 TABLET ORAL at 17:56

## 2019-10-16 RX ADMIN — ACETAMINOPHEN 1000 MG: 500 TABLET, FILM COATED ORAL at 16:31

## 2019-10-16 RX ADMIN — HYDROCODONE BITARTRATE AND ACETAMINOPHEN 1 TABLET: 5; 325 TABLET ORAL at 19:36

## 2019-10-16 RX ADMIN — ZOLPIDEM TARTRATE 5 MG: 5 TABLET ORAL at 22:17

## 2019-10-16 RX ADMIN — PENICILLIN G POTASSIUM 2.5 MILLION UNITS: 20000000 POWDER, FOR SOLUTION INTRAVENOUS at 14:28

## 2019-10-16 RX ADMIN — METHYLERGONOVINE MALEATE 0.2 MG: 0.2 INJECTION, SOLUTION INTRAMUSCULAR; INTRAVENOUS at 16:06

## 2019-10-16 RX ADMIN — ROPIVACAINE HYDROCHLORIDE 10 ML: 2 INJECTION, SOLUTION EPIDURAL; INFILTRATION; PERINEURAL at 11:24

## 2019-10-16 RX ADMIN — ROPIVACAINE HYDROCHLORIDE 10 ML/HR: 2 INJECTION, SOLUTION EPIDURAL; INFILTRATION; PERINEURAL at 11:25

## 2019-10-16 RX ADMIN — MINERAL OIL 120 ML: 471.95 OIL ORAL at 14:36

## 2019-10-16 RX ADMIN — METHYLERGONOVINE MALEATE 200 MCG: 0.2 TABLET ORAL at 22:18

## 2019-10-16 RX ADMIN — LIDOCAINE HYDROCHLORIDE AND EPINEPHRINE 3.5 ML: 15; 5 INJECTION, SOLUTION EPIDURAL at 11:15

## 2019-10-16 RX ADMIN — SODIUM CHLORIDE 5 MILLION UNITS: 900 INJECTION, SOLUTION INTRAVENOUS at 06:25

## 2019-10-16 RX ADMIN — FAMOTIDINE 20 MG: 20 TABLET ORAL at 11:54

## 2019-10-16 RX ADMIN — FAMOTIDINE 20 MG: 20 TABLET ORAL at 02:11

## 2019-10-16 RX ADMIN — OXYTOCIN 2 MILLI-UNITS/MIN: 10 INJECTION, SOLUTION INTRAMUSCULAR; INTRAVENOUS at 06:20

## 2019-10-16 RX ADMIN — SODIUM CHLORIDE, SODIUM LACTATE, POTASSIUM CHLORIDE, CALCIUM CHLORIDE, AND DEXTROSE MONOHYDRATE 125 ML/HR: 600; 310; 30; 20; 5 INJECTION, SOLUTION INTRAVENOUS at 06:20

## 2019-10-16 RX ADMIN — PENICILLIN G POTASSIUM 2.5 MILLION UNITS: 20000000 POWDER, FOR SOLUTION INTRAVENOUS at 10:27

## 2019-10-16 NOTE — PROGRESS NOTES
SBAR OUT Report: Mother    Verbal report given to Zbigniew Martinez RN  (full name & credentials) on this patient, who is now being transferred to [phone] (unit) for routine progression of care. The patient is not wearing a green \"Anesthesia-Duramorph\" band. Report consisted of patient's Situation, Background, Assessment and Recommendations (SBAR). [place] ID bands were compared with the identification form, and verified with the patient and receiving nurse. Information from the SBAR, Intake/Output, MAR, Accordion and Recent Results and the [place] Report was reviewed with the receiving nurse; opportunity for questions and clarification provided.

## 2019-10-16 NOTE — PROGRESS NOTES
Labor Progress Note  Patient seen, fetal heart rate and contraction pattern evaluated, patient examined. Pt not really feeling contractions. Patient Vitals for the past 1 hrs:   BP Pulse   10/16/19 0923 124/77 71   10/16/19 0854 127/73 65       Physical Exam:  Cervical Exam:  3 cm dilated    90% effaced    -3 station    Presenting Part: cephalic  Membranes:  Artificial Rupture of Membranes; Amniotic Fluid: medium amount of clear fluid  Uterine Activity: Frequency: Every 2 minutes  Fetal Heart Rate: Reactive    Assessment/Plan:  Reassuring fetal status, Continue plan for vaginal delivery. Epidural when desired. Fetal anemia s/p steroids.

## 2019-10-16 NOTE — PROGRESS NOTES
10/16/19 0947   Membranes   Membrane Status AROM   Rupture Date 10/16/19   Rupture Time 0947   Odor None   Amniotic Fluid Description Clear   Amniotic Fluid Volume  Large   Cervical Exam   Dilation (cm) 3   Eff 90 %   Vaginal exam done by?  ALT

## 2019-10-16 NOTE — PROGRESS NOTES
Molina placed to gravity, EFM readjusted. Pt placed on the peanut.  More comfortable now with her epidural.

## 2019-10-16 NOTE — PROGRESS NOTES
Pt to room 459, will now give report to nightshift MIU. Pt instructed to call out if she needs something. Family at bs,. Pt not to get out of bed unassisted.

## 2019-10-16 NOTE — ANESTHESIA PREPROCEDURE EVALUATION
Anesthetic History   No history of anesthetic complications            Review of Systems / Medical History  Patient summary reviewed, nursing notes reviewed and pertinent labs reviewed    Pulmonary            Asthma        Neuro/Psych         Psychiatric history     Cardiovascular  Within defined limits                Exercise tolerance: >4 METS     GI/Hepatic/Renal     GERD: well controlled           Endo/Other  Within defined limits           Other Findings              Physical Exam    Airway  Mallampati: I           Cardiovascular  Regular rate and rhythm,  S1 and S2 normal,  no murmur, click, rub, or gallop  Rhythm: regular  Rate: normal      Pertinent negatives: No murmur   Dental  No notable dental hx       Pulmonary  Breath sounds clear to auscultation               Abdominal         Other Findings            Anesthetic Plan    ASA: 2  Anesthesia type: epidural            Anesthetic plan and risks discussed with: Patient and Spouse

## 2019-10-16 NOTE — PROGRESS NOTES
Pt continues to feel uncomfortable on her left side.  Pt moved to left side and she pushed her epidural \"button\" for a bolus per instructions from dr Jane Rubinstein

## 2019-10-16 NOTE — PROGRESS NOTES
Johny Mcclain at bedside at 1107. KD Hastings at bedside at 1107    Assisted pt to sitting up on bedside at 1107. Timeout completed at 12 with MD, KD and myself at bedside. Test dose given at 1115. Negative reaction. Pt assisted to lying back in left tilt position. See anesthesia record for details. See vital sign flow sheet for BP. Tolerated procedure well.

## 2019-10-16 NOTE — PROGRESS NOTES
Dr. Eliseo Vazquez at bedside. Ultrasound used to find where to place external monitor due to RN unable to find and keep fetal heart rate tracing and to verify po.sition of the baby. Baby found to be vertex presentation.

## 2019-10-16 NOTE — ANESTHESIA PROCEDURE NOTES
Epidural Block    Start time: 10/16/2019 11:08 AM  End time: 10/16/2019 11:15 AM  Performed by: Karla Dean MD  Authorized by: Karla Dean MD     Pre-Procedure  Indication: labor epidural    Preanesthetic Checklist: patient identified, risks and benefits discussed, anesthesia consent, patient being monitored, timeout performed and anesthesia consent    Timeout Time: 11:08        Epidural:   Patient position:  Seated  Prep region:  Lumbar  Prep: Chlorhexidine    Location:  L3-4    Needle and Epidural Catheter:   Needle Type:  Tuohy  Needle Gauge:  18 G  Injection Technique:  Loss of resistance using saline  Attempts:  1  Catheter Size:  19 G  Catheter at Skin Depth (cm):  9  Depth in Epidural Space (cm):  4  Events: no blood with aspiration, no cerebrospinal fluid with aspiration, no paresthesia and negative aspiration test    Test Dose:  Lidocaine 1.5% w/ epi    Assessment:   Catheter Secured:  Tegaderm and tape  Insertion:  Uncomplicated  Patient tolerance:  Patient tolerated the procedure well with no immediate complications  Brandan @ 5cm 4cm left in space 9 cm At skin. Discussed the risks and benefits of epidural placement and use with patient including (but not limited to) the risk of wet tap and spinal headache, inadequate analgesia, need for removal/replacement of epidural, and hypotension. Discussed the remote risk of uncontrolled bleeding or infection requiring an operation to remedy. After the patient agreed to proceed, I ensured the patient had no contraindications to labor epidural placement including prohibitive heart defects, hypocoagulation, family or personal history of a bleeding disorder. Epidural placement is described in the block note. Frequent hemodynamic monitoring in the first 30 minutes following initial placement was performed. Patient and RN were instructed to call anytime with any questions.

## 2019-10-16 NOTE — L&D DELIVERY NOTE
Delivery Summary    Patient: Vicky Moncada MRN: [id number]  SSN: [id number]    YOB: 1990  Age: 34 y.o. Sex: female         Head delivered over intact perineum with delivery of anterior shoulder and body to follow. As soon as head delivered, bloody amniotic fluid noted. Bulb suction of mouth and nose on field. Cord clamped and cut and handed to awaiting nursing staff. Cord gas and blood obtained. Placenta manually removed with clot adherent and multiple clots afterward that were free. Uterus with fundus firm and manually explored and noted to be free of placenta. No repair required. Molina placed and about 250cc of urine removed. Bleeding increased nda clot expressed from uterus. She was given IM methergine. Good hemostasis. Mother and baby doing well. Baby to special care nursery. Information for the patient's :  Padilla Lieberman [[id number]]       Labor Events:    Labor: Yes    Steroids: Full Course   Cervical Ripening Date/Time:       Cervical Ripening Type:     Antibiotics During Labor: Yes   Rupture Identifier:      Rupture Date/Time: 10/16/2019 9:47 AM   Rupture Type: AROM   Amniotic Fluid Volume: Polyhydramic    Amniotic Fluid Description: Clear    Amniotic Fluid Odor: None    Induction: AROM; Oxytocin       Induction Date/Time:        Indications for Induction: Other(comment)    Augmentation: None   Augmentation Date/Time:      Indications for Augmentation:     Labor complications: Abruptio Placenta       Additional complications:        Delivery Events:  Indications For Episiotomy:     Episiotomy: None   Perineal Laceration(s): None   Repaired:     Periurethral Laceration Location:      Repaired:     Labial Laceration Location:     Repaired:     Sulcal Laceration Location:     Repaired:     Vaginal Laceration Location:     Repaired:     Cervical Laceration Location:     Repaired:     Repair Suture: None   Number of Repair Packets:     Estimated Blood Loss (ml): 350ml     Delivery Date: 10/16/2019    Delivery Time: 3:58 PM  Delivery Type: Vaginal, Spontaneous  Sex:  Male    Gestational Age: 34w4d   Delivery Clinician:  Manda Chance  Living Status: Living   Delivery Location: L&D            APGARS  One minute Five minutes Ten minutes   Skin color: 0   1        Heart rate: 2   2        Grimace: 0   1        Muscle tone: 1   1        Breathin   2        Totals: 4   7            Presentation: Vertex    Position: Middle Occiput Anterior  Resuscitation Method:  C-PAP; Tactile Stimulation;Suctioning-bulb;Suctioning-deep     Meconium Stained: None      Cord Information: 3 Vessels  Complications: Other(Comment) (Comment)  Cord around: trunk  left upper extremity  Delayed cord clamping? No  Cord clamped date/time:10/16/2019  4:02 PM  Disposition of Cord Blood: Lab    Blood Gases Sent?: Yes    Placenta:  Date/Time:    Removal: Manual Removal      Appearance: Abnormal;Other (comment)     [place] Measurements:  Birth Weight: 5 lb 1.1 oz (2.3 kg)      Birth Length: 1' 5.91\" (0.455 m)      Head Circumference: 1' 1.39\" (0.34 m)      Chest Circumference: 11.02\" (0.28 m)     Abdominal Girth: Other Providers:   DONITA CHANCE;CAMMIE RANGEL;MANJINDER REYES;TOMI SIMON;TATUM HO;MK THOMAS;VICKY VERAS;ADRIANO MONTANO;JAMESON POZO, Obstetrician;Primary Nurse;Primary [place] Nurse;Neonatologist;Anesthesiologist;Crna;Respiratory Therapist;Scrub Tech;Charge Nurse           Group B Strep: No results found for: GRBSEXT, GRBSEXT  Information for the patient's :  Zohra Hallman [[id number]]   No results found for: ABORH, PCTABR, PCTDIG, BILI, ABORHEXT, ABORH    No results for input(s): PCO2CB, PO2CB, HCO3I, SO2I, IBD, PTEMPI, SPECTI, PHICB, ISITE, IDEV, IALLEN in the last 72 hours.

## 2019-10-16 NOTE — PROGRESS NOTES
Variables noted with contractions. Sve: 8 / c/ 0. Dr alt at bs. Off peanut and legs remain butterflied.  Pitocin from 14 mu/min to 6 mu/min

## 2019-10-16 NOTE — PROGRESS NOTES
Pt assisted oob to the bathroom. Gait steady. Unable to void at this time. Pericare/ pad change.  Pt back to wheelchair for transport to [facility] then to [facility]

## 2019-10-16 NOTE — PROGRESS NOTES
Epidural turmed off. Requesting medication for headache.  Baby to the nursery at [phone] by Dr Olguin Pi, RT, and Sushila Keating RN

## 2019-10-17 LAB
BACTERIA SPEC CULT: NORMAL
SERVICE CMNT-IMP: NORMAL

## 2019-10-17 PROCEDURE — 65270000029 HC RM PRIVATE

## 2019-10-17 PROCEDURE — 74011250637 HC RX REV CODE- 250/637: Performed by: OBSTETRICS & GYNECOLOGY

## 2019-10-17 RX ORDER — POLYETHYLENE GLYCOL 3350 17 G/17G
17 POWDER, FOR SOLUTION ORAL
Status: DISCONTINUED | OUTPATIENT
Start: 2019-10-17 | End: 2019-10-18 | Stop reason: HOSPADM

## 2019-10-17 RX ORDER — DOCUSATE SODIUM 100 MG/1
100 CAPSULE, LIQUID FILLED ORAL DAILY
Status: DISCONTINUED | OUTPATIENT
Start: 2019-10-17 | End: 2019-10-18 | Stop reason: HOSPADM

## 2019-10-17 RX ADMIN — IBUPROFEN 800 MG: 800 TABLET ORAL at 06:55

## 2019-10-17 RX ADMIN — CITALOPRAM HYDROBROMIDE 40 MG: 20 TABLET ORAL at 08:37

## 2019-10-17 RX ADMIN — HYDROCODONE BITARTRATE AND ACETAMINOPHEN 1 TABLET: 5; 325 TABLET ORAL at 12:26

## 2019-10-17 RX ADMIN — HYDROCODONE BITARTRATE AND ACETAMINOPHEN 1 TABLET: 5; 325 TABLET ORAL at 18:11

## 2019-10-17 RX ADMIN — METHYLERGONOVINE MALEATE 200 MCG: 0.2 TABLET ORAL at 18:11

## 2019-10-17 RX ADMIN — HYDROCODONE BITARTRATE AND ACETAMINOPHEN 1 TABLET: 5; 325 TABLET ORAL at 06:55

## 2019-10-17 RX ADMIN — METHYLERGONOVINE MALEATE 200 MCG: 0.2 TABLET ORAL at 12:26

## 2019-10-17 RX ADMIN — DOCUSATE SODIUM 100 MG: 100 CAPSULE, LIQUID FILLED ORAL at 08:37

## 2019-10-17 RX ADMIN — METHYLERGONOVINE MALEATE 200 MCG: 0.2 TABLET ORAL at 06:12

## 2019-10-17 NOTE — PROGRESS NOTES
met with patient prior to delivery. Baby boy \"Kermit\" born on 10/16/19 at 34w4d. NAILA: 19.  follow-up with family. Both patient and  state that they are doing well and are happy with baby's progress. Patient reports having a strong support system consisting of her  and family. Patient's first child was born at 32w3d () and required a transfer to [facility] after delivery, so she and her  are familiar with having a child in the NICU. Patient is currently on 40mg of Celexa for depression and anxiety. She confirms experiencing postpartum depression with her first child (). At that time, her dose was increased from 20mg to 40mg. She has remained on 40mg. Patient states that the Celexa manages her symptoms well.  provided informational packet on  mood disorder education/resources. Family receptive to receiving information and denied any additional needs from . Family has 's contact information should any needs/questions arise.     VALERIE Caballero  [place]   [phone]

## 2019-10-17 NOTE — PROGRESS NOTES
Report received from Darcella Holter, RN care assumed. Pt in bed with call light in reach. No complaints at this time.

## 2019-10-17 NOTE — PROGRESS NOTES
10/17/19 0655   Pain Assessment   Pain Scale 1 Numeric (0 - 10)   Pain Intensity 1 7   Pain Location 1 Abdomen   Pain Description 1 Cramping   Pain Intervention(s) 1 Medication (see MAR)   POSS Scale   Opioid Sedation Scale 1   per pt request, instructed to call with in the hour if not effective

## 2019-10-17 NOTE — LACTATION NOTE
Called to nursery to assist with a feeding. Mom stated that infant will latch and take a couple of sucks but can't maintain the latch. Mom wanted to try a nipple shield. Placed a 16 mm shield over mom's left nipple. Mom placed infant in cross cradle hold to her breast and after a few attempts infant latched and started to suck. Infant sucked rhythmically for 10 minutes. Milk was noted in the shield. Instructed mom on how to clean and store shield. To use each time she attempts to feed infant.  Lactation consultant will follow up tomorrow,

## 2019-10-17 NOTE — PROGRESS NOTES
Dr. Cheryle Pitt making rounds, per MD pt to Methergine x 4 dose and then can be d/c. Orders read back and verified.

## 2019-10-17 NOTE — PROGRESS NOTES
Shift assessment complete see flowsheet. Discussed today plan of care with pt. Encouraged pt to ambulate in hallway today. Bleeding precautions given to pt. Pt voiced understanding. Pt c/o vaginal soreness, ice pack given. Informed pt that an order for Miralax prn has been ordered and if she wants it to let this RN know. pt voiced understanding. Pt to call with needs/concerns. Pt in bed eating with call light in reach.

## 2019-10-18 VITALS
RESPIRATION RATE: 16 BRPM | HEART RATE: 63 BPM | TEMPERATURE: 98.5 F | SYSTOLIC BLOOD PRESSURE: 112 MMHG | OXYGEN SATURATION: 97 % | WEIGHT: 161 LBS | DIASTOLIC BLOOD PRESSURE: 69 MMHG | BODY MASS INDEX: 25.27 KG/M2 | HEIGHT: 67 IN

## 2019-10-18 PROCEDURE — 74011250637 HC RX REV CODE- 250/637: Performed by: OBSTETRICS & GYNECOLOGY

## 2019-10-18 RX ORDER — HYDROCODONE BITARTRATE AND ACETAMINOPHEN 5; 325 MG/1; MG/1
1 TABLET ORAL
Qty: 15 TAB | Refills: 0 | Status: SHIPPED | OUTPATIENT
Start: 2019-10-18 | End: 2019-10-21

## 2019-10-18 RX ORDER — IBUPROFEN 800 MG/1
800 TABLET ORAL
Qty: 40 TAB | Refills: 0 | Status: SHIPPED
Start: 2019-10-18 | End: 2019-10-30

## 2019-10-18 RX ADMIN — DOCUSATE SODIUM 100 MG: 100 CAPSULE, LIQUID FILLED ORAL at 08:49

## 2019-10-18 RX ADMIN — CITALOPRAM HYDROBROMIDE 40 MG: 20 TABLET ORAL at 08:49

## 2019-10-18 RX ADMIN — IBUPROFEN 800 MG: 800 TABLET ORAL at 08:49

## 2019-10-18 RX ADMIN — POLYETHYLENE GLYCOL (3350) 17 G: 17 POWDER, FOR SOLUTION ORAL at 08:49

## 2019-10-18 NOTE — DISCHARGE SUMMARY
Obstetrical Discharge Summary     Name: Yenni Sinclair MRN: [id number]  SSN: [id number]    YOB: 1990  Age: 34 y.o. Sex: female      Allergies: Cephalosporins    Admit Date: 10/14/2019    Discharge Date: 10/18/2019     Admitting Physician: Arthur Lugo MD     Attending Physician:  Marain Salinas MD     * Admission Diagnoses: Fetal anemia affecting management of mother in third trimester [O36.8230]    * Discharge Diagnoses:   Information for the patient's :  Sridhar Montelongo [[id number]]   Delivery of a 5 lb 1.1 oz (2.3 kg) male infant via Vaginal, Spontaneous on 10/16/2019 at 3:58 PM  by Katherin Chance. Apgars were 4  and 7 . Additional Diagnoses:   Hospital Problems as of 10/18/2019 Date Reviewed: 10/15/2019          Codes Class Noted - Resolved POA    Fetal anemia affecting management of mother in third trimester ICD-10-CM: O36.8230  ICD-9-CM: 678.03  10/14/2019 - Present Unknown    Overview Signed 10/16/2019  8:29 AM by Wilmer Walsh MD     10/16/2019 [facility]: all maternal TORCH serology within normal limits             * (Principal) Pregnancy complicated by umbilical cord varix in antepartum period ICD-10-CM: O69.89X0  ICD-9-CM: 663.83  10/10/2019 - Present Yes    Overview Addendum 10/14/2019  9:09 AM by Inder Fernandes MD     10/10/2019 [facility]: stable from prior. Intraabdominal, no turbulent flow. MCA borderline. Repeat MCA with MFM in 4 days. If fetal anemia, probable delivery. 10/14/2019 at [facility]: MCA Doppler PSV more elevated today, c/w worsening fetal anemia. BPP-8/8, severe Polyhydramnios. Recommend admitting for steroids then induction after 48 hours.                     Lab Results   Component Value Date/Time    ABO/Rh(D) B POSITIVE 10/14/2019 11:07 AM    Rubella, External Immune 2019    ABO,Rh B POS 2015      Immunization History   Administered Date(s) Administered    Influenza Vaccine 10/30/2016, 2017    Influenza Vaccine (Quad) Mdck Pf 10/02/2019    TB Skin Test (PPD) Intradermal 04/02/2019       * Procedures: vaginal delivery  * No surgery found *           * Discharge Condition: good    [facility] Course: Normal hospital course following the delivery. * Disposition: Home    Discharge Medications:   Current Discharge Medication List      START taking these medications    Details   HYDROcodone-acetaminophen (NORCO) 5-325 mg per tablet Take 1 Tab by mouth every four (4) hours as needed for Pain for up to 3 days. Max Daily Amount: 6 Tabs. Qty: 15 Tab, Refills: 0    Associated Diagnoses: Fetal anemia affecting management of pregnancy in third trimester, single or unspecified fetus      ibuprofen (MOTRIN) 800 mg tablet Take 1 Tab by mouth every six (6) hours as needed for Pain. Qty: 40 Tab, Refills: 0         CONTINUE these medications which have NOT CHANGED    Details   calcium carbonate (TUMS) 200 mg calcium (500 mg) chew Take 2 Tabs by mouth as needed. citalopram (CELEXA) 40 mg tablet Take 1 Tab by mouth daily. Qty: 90 Tab, Refills: 3      PNV66-Iron Fumarate-FA-DSS-DHA 26-1.2- mg cap Take  by mouth. acetaminophen (TYLENOL EXTRA STRENGTH) 500 mg tablet Take  by mouth every six (6) hours as needed for Pain. cetirizine (ZYRTEC) 10 mg tablet Take  by mouth. STOP taking these medications       NIFEdipine (PROCARDIA) 10 mg capsule Comments:   Reason for Stopping:         Blood-Glucose Meter monitoring kit Comments:   Reason for Stopping:         glucose blood VI test strips (BLOOD GLUCOSE TEST) strip Comments:   Reason for Stopping:         lancets misc Comments:   Reason for Stopping:         OTHER Comments:   Reason for Stopping:               * Follow-up Care/Patient Instructions:   Activity: No sex for 6 weeks, No driving while on analgesics and No heavy lifting for 4 weeks  Diet: Regular Diet  Wound Care: Keep wound clean and dry    Follow-up Information     Follow up With Specialties Details Why Contact Info Echo Camara MD Internal Medicine   [id number]  [phone]

## 2019-10-18 NOTE — PROGRESS NOTES
Post-Delivery Day Number 1 Progress Note    Patient doing well post-delivery day 1 from vaginal delivery without significant complaints. Pain controlled on current medication. Tolerating diet. Ambulating/Voiding without difficulty, normal lochia. Vitals:  Blood pressure 124/88, pulse (!) 59, temperature 98.1 °F (36.7 °C), resp. rate 18, height 5' 7\" (1.702 m), weight 161 lb (73 kg), last menstrual period 02/14/2019, SpO2 97 %, unknown if currently breastfeeding. Vital signs stable, afebrile. Exam:  Patient without distress. Abdomen soft, fundus firm at level of umbilicus, nontender. Lower extremities are negative for swelling, cords or tenderness. Lochia- moderate    Labs: Hgb 12.0    Assessment and Plan:  Patient appears to be having uncomplicated post-vaginal delivery course. Continue routine post-op care and maternal education.  Expect d/c am.  Baby in NICU doing well

## 2019-10-18 NOTE — PROGRESS NOTES
Post-Partum Day Number 2 Progress/Discharge Note    Patient doing well post-partum without significant complaint. Voiding without difficulty, normal lochia, positive flatus. Vitals:    Patient Vitals for the past 8 hrs:   BP Temp Pulse Resp SpO2   10/18/19 0729 112/69 98.5 °F (36.9 °C) 63 16 97 %     Temp (24hrs), Av.5 °F (36.9 °C), Min:98.5 °F (36.9 °C), Max:98.5 °F (36.9 °C)      Vital signs stable, afebrile. Exam:  Patient without distress. Abdomen soft, fundus firm at level of umbilicus, non tender               Lower extremities are negative for swelling, cords or tenderness. Lab/Data Review: All lab results for the last 24 hours reviewed. Assessment and Plan:  Patient appears to be having uncomplicated post-partum course. Continue routine perineal care and maternal education. Plan discharge for today with follow up in our office in 2 weeks.

## 2019-10-18 NOTE — DISCHARGE INSTRUCTIONS
Discharge instruction to follow: Activity: Pelvis rest for 6 weeks     No heavy lifting over 15 lbs for 2 weeks     No driving for 2 weeks     No push/pull motion such as sweeping or vacuuming for 2 weeks     No tub baths for 6 weeks    If using grant-bottle continue to use until comfortable stopping. Change sanitary pad after each urination or bowel movement. Call MD for the following:      Fever over 101 F; pain not relieved by medication; foul smelling vaginal discharge or an increase in vaginal bleeding. Take medication as prescribed. Follow up with MD as order. Patient Education        Vaginal Childbirth: Care Instructions  Your Care Instructions    Your body will slowly heal in the next few weeks. It is easy to get too tired and overwhelmed during the first weeks after your baby is born. Changes in your hormones can shift your mood without warning. You may find it hard to meet the extra demands on your energy and time. Take it easy on yourself. Follow-up care is a key part of your treatment and safety. Be sure to make and go to all appointments, and call your doctor if you are having problems. It's also a good idea to know your test results and keep a list of the medicines you take. How can you care for yourself at home? · Vaginal bleeding and cramps  ? After delivery, you will have a bloody discharge from the vagina. This will turn pink within a week and then white or yellow after about 10 days. It may last for 2 to 4 weeks or longer, until the uterus has healed. Use pads instead of tampons until you stop bleeding. ? Do not worry if you pass some blood clots, as long as they are smaller than a golf ball. If you have a tear or stitches in your vaginal area, change the pad at least every 4 hours to prevent soreness and infection. ? You may have cramps for the first few days after childbirth. These are normal and occur as the uterus shrinks to normal size.  Take an over-the-counter pain medicine, such as acetaminophen (Tylenol), ibuprofen (Advil, Motrin), or naproxen (Aleve), for cramps. Read and follow all instructions on the label. Do not take aspirin, because it can cause more bleeding. ? Do not take two or more pain medicines at the same time unless the doctor told you to. Many pain medicines have acetaminophen, which is Tylenol. Too much acetaminophen (Tylenol) can be harmful. · Stitches  ? If you have stitches, they will dissolve on their own and do not need to be removed. Follow your doctor's instructions for cleaning the stitched area. ? Put ice or a cold pack on your painful area for 10 to 20 minutes at a time, several times a day, for the first few days. Put a thin cloth between the ice and your skin. ? Sit in a few inches of warm water (sitz bath) 3 times a day and after bowel movements. The warm water helps with pain and itching. If you do not have a tub, a warm shower might help. · Breast fullness  ? Your breasts may overfill (engorge) in the first few days after delivery. To help milk flow and to relieve pain, warm your breasts in the shower or by using warm, moist towels before nursing. ? If you are not nursing, do not put warmth on your breasts or touch your breasts. Wear a tight bra or sports bra and use ice until the fullness goes away. This usually takes 2 to 3 days. ? Put ice or a cold pack on your breast after nursing to reduce swelling and pain. Put a thin cloth between the ice and your skin. · Activity  ? Eat a balanced diet. Do not try to lose weight by cutting calories. Keep taking your prenatal vitamins, or take a multivitamin. ? Get as much rest as you can. Try to take naps when your baby sleeps during the day. ? Get some exercise every day. But do not do any heavy exercise until your doctor says it is okay. ? Wait until you are healed (about 4 to 6 weeks) before you have sexual intercourse. Your doctor will tell you when it is okay to have sex.   ? Talk to your doctor about birth control. You can get pregnant even before your period returns. Also, you can get pregnant while you are breastfeeding. · Mental health  ? It is normal to have some sadness, anxiety, sleeplessness, and mood swings after you go home. If you feel upset or hopeless for more than a few days or are having trouble doing the things you need to do, talk to your doctor. · Constipation and hemorrhoids  ? Drink plenty of fluids, enough so that your urine is light yellow or clear like water. If you have kidney, heart, or liver disease and have to limit fluids, talk with your doctor before you increase the amount of fluids you drink. ? Eat plenty of fiber each day. Have a bran muffin or bran cereal for breakfast, and try eating a piece of fruit for a mid-afternoon snack. ? For painful, itchy hemorrhoids, put ice or a cold pack on the area several times a day for 10 minutes at a time. Follow this by putting a warm compress on the area for another 10 to 20 minutes or by sitting in a shallow, warm bath. When should you call for help? Call 911 anytime you think you may need emergency care. For example, call if:    · You have thoughts of harming yourself, your baby, or another person.     · You passed out (lost consciousness).     · You have chest pain, are short of breath, or cough up blood.     · You have a seizure.    Call your doctor now or seek immediate medical care if:    · You have severe vaginal bleeding.     · You are dizzy or lightheaded, or you feel like you may faint.     · You have a fever.     · You have new or more pain in your belly or pelvis.     · You have symptoms of a blood clot in your leg (called a deep vein thrombosis), such as:  ? Pain in the calf, back of the knee, thigh, or groin. ? Redness and swelling in your leg or groin.     · You have signs of preeclampsia, such as:  ? Sudden swelling of your face, hands, or feet.   ? New vision problems (such as dimness, blurring, or seeing spots). ? A severe headache.    Watch closely for changes in your health, and be sure to contact your doctor if:    · Your vaginal bleeding seems to be getting heavier.     · You have new or worse vaginal discharge.     · You feel sad, anxious, or hopeless for more than a few days.     · You do not get better as expected. Where can you learn more? Go to http://linda-juanita.info/. Enter T473 in the search box to learn more about \"Vaginal Childbirth: Care Instructions. \"  Current as of: May 29, 2019  Content Version: 12.2  © 8751-6720 [employer]. Care instructions adapted under license by [employer] (which disclaims liability or warranty for this information). If you have questions about a medical condition or this instruction, always ask your healthcare professional. [address] any warranty or liability for your use of this information.

## 2019-10-22 NOTE — ANESTHESIA POSTPROCEDURE EVALUATION
* No procedures listed *. No value filed. Anesthesia Post Evaluation      Multimodal analgesia: multimodal analgesia used between 6 hours prior to anesthesia start to PACU discharge  Patient location during evaluation: bedside  Patient participation: complete - patient participated  Level of consciousness: awake and responsive to light touch  Pain management: adequate  Airway patency: patent  Anesthetic complications: no  Cardiovascular status: acceptable, hemodynamically stable, blood pressure returned to baseline and stable  Respiratory status: acceptable, unassisted, spontaneous ventilation and nonlabored ventilation  Hydration status: acceptable  Post anesthesia nausea and vomiting:  controlled      No vitals data found for the desired time range.

## 2019-11-19 PROBLEM — O09.93 HIGH-RISK PREGNANCY IN THIRD TRIMESTER: Status: RESOLVED | Noted: 2019-04-16 | Resolved: 2019-11-19

## 2019-11-19 PROBLEM — O36.8230: Status: RESOLVED | Noted: 2019-10-14 | Resolved: 2019-11-19

## 2022-03-18 PROBLEM — O34.03 UTERUS BICORNIS AFFECTING PREGNANCY IN THIRD TRIMESTER: Status: ACTIVE | Noted: 2019-05-21

## 2022-03-18 PROBLEM — Q51.3 UTERUS BICORNIS AFFECTING PREGNANCY IN THIRD TRIMESTER: Status: ACTIVE | Noted: 2019-05-21

## 2022-03-19 PROBLEM — Z23 ENCOUNTER FOR IMMUNIZATION: Status: ACTIVE | Noted: 2019-10-03

## 2022-03-20 PROBLEM — F32.A DEPRESSION AFFECTING PREGNANCY: Status: ACTIVE | Noted: 2019-04-16

## 2022-03-20 PROBLEM — O99.340 DEPRESSION AFFECTING PREGNANCY: Status: ACTIVE | Noted: 2019-04-16

## 2022-11-14 NOTE — PROGRESS NOTES
RN called patient to offer 11/18/22 appt with Dr. Larson (30 min follow up time slot). No answer, left voicemail with call back number.   Teaching for self care reviewed. Discharge instructions reviewed and E-signed. Copy given to pt. Prescription given with information. Reviewed follow up appointment for self. Questions encouraged and answered. Pt ready for scheduled discharge to home. Escorted off unit by [facility] staff to private vehicle.

## 2023-04-18 ENCOUNTER — OFFICE VISIT (OUTPATIENT)
Dept: OBGYN CLINIC | Age: 33
End: 2023-04-18
Payer: COMMERCIAL

## 2023-04-18 VITALS
DIASTOLIC BLOOD PRESSURE: 64 MMHG | WEIGHT: 125.8 LBS | SYSTOLIC BLOOD PRESSURE: 104 MMHG | BODY MASS INDEX: 19.74 KG/M2 | HEIGHT: 67 IN

## 2023-04-18 DIAGNOSIS — Z13.89 SCREENING FOR GENITOURINARY CONDITION: ICD-10-CM

## 2023-04-18 DIAGNOSIS — Z01.419 WELL WOMAN EXAM: Primary | ICD-10-CM

## 2023-04-18 DIAGNOSIS — N92.6 IRREGULAR MENSES: ICD-10-CM

## 2023-04-18 LAB
BILIRUBIN, URINE, POC: NEGATIVE
BLOOD URINE, POC: NORMAL
GLUCOSE URINE, POC: NEGATIVE
HCG, PREGNANCY, URINE, POC: NEGATIVE
KETONES, URINE, POC: NEGATIVE
LEUKOCYTE ESTERASE, URINE, POC: NEGATIVE
NITRITE, URINE, POC: NEGATIVE
PH, URINE, POC: 6 (ref 4.6–8)
PROTEIN,URINE, POC: NEGATIVE
SPECIFIC GRAVITY, URINE, POC: 1.02 (ref 1–1.03)
URINALYSIS CLARITY, POC: CLEAR
URINALYSIS COLOR, POC: NORMAL
UROBILINOGEN, POC: NORMAL
VALID INTERNAL CONTROL, POC: YES

## 2023-04-18 PROCEDURE — 81025 URINE PREGNANCY TEST: CPT | Performed by: OBSTETRICS & GYNECOLOGY

## 2023-04-18 PROCEDURE — 81002 URINALYSIS NONAUTO W/O SCOPE: CPT | Performed by: OBSTETRICS & GYNECOLOGY

## 2023-04-18 PROCEDURE — 99395 PREV VISIT EST AGE 18-39: CPT | Performed by: OBSTETRICS & GYNECOLOGY

## 2023-04-18 RX ORDER — CARIPRAZINE 1.5 MG/1
1.5 CAPSULE, GELATIN COATED ORAL DAILY
COMMUNITY
Start: 2023-04-05

## 2023-04-18 RX ORDER — BUPROPION HYDROBROMIDE 348 MG/1
1 TABLET, EXTENDED RELEASE ORAL DAILY
COMMUNITY
Start: 2023-03-23

## 2023-04-18 RX ORDER — TEMAZEPAM 7.5 MG/1
7.5 CAPSULE ORAL PRN
COMMUNITY
Start: 2022-12-07

## 2023-04-18 ASSESSMENT — PATIENT HEALTH QUESTIONNAIRE - PHQ9
4. FEELING TIRED OR HAVING LITTLE ENERGY: 3
6. FEELING BAD ABOUT YOURSELF - OR THAT YOU ARE A FAILURE OR HAVE LET YOURSELF OR YOUR FAMILY DOWN: 2
10. IF YOU CHECKED OFF ANY PROBLEMS, HOW DIFFICULT HAVE THESE PROBLEMS MADE IT FOR YOU TO DO YOUR WORK, TAKE CARE OF THINGS AT HOME, OR GET ALONG WITH OTHER PEOPLE: 2
8. MOVING OR SPEAKING SO SLOWLY THAT OTHER PEOPLE COULD HAVE NOTICED. OR THE OPPOSITE, BEING SO FIGETY OR RESTLESS THAT YOU HAVE BEEN MOVING AROUND A LOT MORE THAN USUAL: 2
7. TROUBLE CONCENTRATING ON THINGS, SUCH AS READING THE NEWSPAPER OR WATCHING TELEVISION: 3
SUM OF ALL RESPONSES TO PHQ QUESTIONS 1-9: 19
SUM OF ALL RESPONSES TO PHQ9 QUESTIONS 1 & 2: 6
5. POOR APPETITE OR OVEREATING: 2
3. TROUBLE FALLING OR STAYING ASLEEP: 1
9. THOUGHTS THAT YOU WOULD BE BETTER OFF DEAD, OR OF HURTING YOURSELF: 0
SUM OF ALL RESPONSES TO PHQ QUESTIONS 1-9: 19
1. LITTLE INTEREST OR PLEASURE IN DOING THINGS: 3
2. FEELING DOWN, DEPRESSED OR HOPELESS: 3

## 2023-04-18 NOTE — PROGRESS NOTES
4/18/2023    Tobyl Filiberto  1990  Age: 28 y.o. Patient's last menstrual period was 03/01/2023 (within days). [id number]  PCP: Berkley Gallegos MD  Patient does see them for regular preventative visits. HPI: No gyn concerns. Off contraception in an effort to stabilize mood treatments for over a year. No flowsheet data found. Allergies, medications, past medical and surgical history, social history, family history all reviewed. Review of Systems  Constitutional:  Denies unexplained weight loss or heat or cold intolerance  ENT: Denies change in vision, change in hearing, frequent headaches  Cardiovascular:  Denies chest pain, swelling in legs or feet, shortness of breath when lying flat  Respiratory:  Denies shortness of breath, cough greater than 2 weeks or coughing up blood  Gastro: Denies diarrhea greater than 2 weeks, rectal bleeding, bloody stools, heartburn, or constipation  :  Denies blood in urine, nocturia, dysuria or incontinence  Breast:  Denies nipple discharge, masses or pain  Skin:  Denies rash greater than 2 weeks, change in moles  Musculoskeletal/Neuro:  Denies joint pain, muscle weakness, seizures, loss of balance or frequent falls  Psych:  Denies frequent crying spells or severe anxiety  Heme:  Denies easy bruising, bleeding gums, frequent nosebleeds or swollen lymph nodes  GYN:  Denies bleeding or spotting between menses, heavy menses, menses longer than 7 days, pain with sex, severe menstrual cramps. Vitals:    04/18/23 1531   BP: 104/64   Site: Right Upper Arm   Position: Sitting   Weight: 125 lb 12.8 oz (57.1 kg)   Height: 5' 7\" (1.702 m)       Body mass index is 19.7 kg/m². Pt in no distress. Alert and oriented x3. Affect bright. Well developed, well nourished. HEENT: normocephalic, atraumatic. Sclerae nonicteric. Neck supple w/o thyromegaly. Trachea midline. Heart: regular rate and rhythm, no murmur or gallop  Lungs: clear to auscultation.

## 2023-04-22 LAB
CYTOLOGIST CVX/VAG CYTO: NORMAL
CYTOLOGY CVX/VAG DOC THIN PREP: NORMAL
HPV APTIMA: NEGATIVE
HPV GENOTYPE REFLEX: NORMAL
Lab: NORMAL
PATH REPORT.FINAL DX SPEC: NORMAL
STAT OF ADQ CVX/VAG CYTO-IMP: NORMAL